# Patient Record
Sex: FEMALE | Race: WHITE | NOT HISPANIC OR LATINO | Employment: OTHER | URBAN - NONMETROPOLITAN AREA
[De-identification: names, ages, dates, MRNs, and addresses within clinical notes are randomized per-mention and may not be internally consistent; named-entity substitution may affect disease eponyms.]

---

## 2017-11-17 ENCOUNTER — TRANSFERRED RECORDS (OUTPATIENT)
Dept: HEALTH INFORMATION MANAGEMENT | Facility: OTHER | Age: 72
End: 2017-11-17

## 2018-11-26 ENCOUNTER — TRANSFERRED RECORDS (OUTPATIENT)
Dept: HEALTH INFORMATION MANAGEMENT | Facility: OTHER | Age: 73
End: 2018-11-26

## 2019-12-12 ENCOUNTER — TRANSFERRED RECORDS (OUTPATIENT)
Dept: HEALTH INFORMATION MANAGEMENT | Facility: OTHER | Age: 74
End: 2019-12-12

## 2020-12-28 ENCOUNTER — HOSPITAL ENCOUNTER (OUTPATIENT)
Dept: MAMMOGRAPHY | Facility: OTHER | Age: 75
Discharge: HOME | End: 2020-12-28
Attending: INTERNAL MEDICINE | Admitting: INTERNAL MEDICINE
Payer: MEDICARE

## 2020-12-28 DIAGNOSIS — Z12.31 VISIT FOR SCREENING MAMMOGRAM: ICD-10-CM

## 2020-12-28 PROCEDURE — 77063 BREAST TOMOSYNTHESIS BI: CPT

## 2021-01-14 ENCOUNTER — OFFICE VISIT (OUTPATIENT)
Dept: PEDIATRICS | Facility: OTHER | Age: 76
End: 2021-01-14
Attending: INTERNAL MEDICINE
Payer: MEDICARE

## 2021-01-14 VITALS
HEART RATE: 58 BPM | RESPIRATION RATE: 16 BRPM | OXYGEN SATURATION: 97 % | HEIGHT: 65 IN | WEIGHT: 185.8 LBS | DIASTOLIC BLOOD PRESSURE: 76 MMHG | SYSTOLIC BLOOD PRESSURE: 130 MMHG | TEMPERATURE: 98.1 F | BODY MASS INDEX: 30.96 KG/M2

## 2021-01-14 DIAGNOSIS — I10 ESSENTIAL HYPERTENSION: ICD-10-CM

## 2021-01-14 DIAGNOSIS — Z78.0 ASYMPTOMATIC MENOPAUSAL STATE: ICD-10-CM

## 2021-01-14 DIAGNOSIS — E78.5 DYSLIPIDEMIA: ICD-10-CM

## 2021-01-14 DIAGNOSIS — E53.8 VITAMIN B12 DEFICIENCY (NON ANEMIC): ICD-10-CM

## 2021-01-14 DIAGNOSIS — R53.1 WEAKNESS: ICD-10-CM

## 2021-01-14 DIAGNOSIS — R42 DIZZINESS: Primary | ICD-10-CM

## 2021-01-14 DIAGNOSIS — N18.31 STAGE 3A CHRONIC KIDNEY DISEASE (H): ICD-10-CM

## 2021-01-14 DIAGNOSIS — Z12.11 SCREENING FOR COLON CANCER: ICD-10-CM

## 2021-01-14 DIAGNOSIS — Z13.820 SCREENING FOR OSTEOPOROSIS: ICD-10-CM

## 2021-01-14 DIAGNOSIS — R79.89 OTHER SPECIFIED ABNORMAL FINDINGS OF BLOOD CHEMISTRY: ICD-10-CM

## 2021-01-14 DIAGNOSIS — I49.3 PVC'S (PREMATURE VENTRICULAR CONTRACTIONS): ICD-10-CM

## 2021-01-14 DIAGNOSIS — Z11.59 NEED FOR HEPATITIS C SCREENING TEST: ICD-10-CM

## 2021-01-14 DIAGNOSIS — I49.1 PAC (PREMATURE ATRIAL CONTRACTION): ICD-10-CM

## 2021-01-14 LAB
ANION GAP SERPL CALCULATED.3IONS-SCNC: 7 MMOL/L (ref 3–14)
BUN SERPL-MCNC: 17 MG/DL (ref 7–25)
CALCIUM SERPL-MCNC: 9.4 MG/DL (ref 8.6–10.3)
CHLORIDE SERPL-SCNC: 104 MMOL/L (ref 98–107)
CHOLEST SERPL-MCNC: 161 MG/DL
CO2 SERPL-SCNC: 28 MMOL/L (ref 21–31)
CREAT SERPL-MCNC: 1.01 MG/DL (ref 0.6–1.2)
DEPRECATED CALCIDIOL+CALCIFEROL SERPL-MC: 38.5 NG/ML
GFR SERPL CREATININE-BSD FRML MDRD: 53 ML/MIN/{1.73_M2}
GLUCOSE SERPL-MCNC: 97 MG/DL (ref 70–105)
HCV AB SERPL QL IA: NONREACTIVE
HDLC SERPL-MCNC: 51 MG/DL (ref 23–92)
IRON SATN MFR SERPL: 32 % (ref 20–55)
IRON SERPL-MCNC: 93 UG/DL (ref 50–212)
LDLC SERPL CALC-MCNC: 93 MG/DL
NONHDLC SERPL-MCNC: 110 MG/DL
POTASSIUM SERPL-SCNC: 4.4 MMOL/L (ref 3.5–5.1)
SODIUM SERPL-SCNC: 139 MMOL/L (ref 134–144)
TIBC SERPL-MCNC: 294 UG/DL (ref 245–400)
TRIGL SERPL-MCNC: 85 MG/DL
TSH SERPL DL<=0.05 MIU/L-ACNC: 3.81 IU/ML (ref 0.34–5.6)
UIBC (UNSATURATED): 201 MG/DL
VIT B12 SERPL-MCNC: 184 PG/ML (ref 180–914)

## 2021-01-14 PROCEDURE — 80061 LIPID PANEL: CPT | Mod: ZL | Performed by: INTERNAL MEDICINE

## 2021-01-14 PROCEDURE — G0463 HOSPITAL OUTPT CLINIC VISIT: HCPCS

## 2021-01-14 PROCEDURE — 82306 VITAMIN D 25 HYDROXY: CPT | Mod: ZL | Performed by: INTERNAL MEDICINE

## 2021-01-14 PROCEDURE — 86803 HEPATITIS C AB TEST: CPT | Mod: ZL | Performed by: INTERNAL MEDICINE

## 2021-01-14 PROCEDURE — 83550 IRON BINDING TEST: CPT | Mod: ZL | Performed by: INTERNAL MEDICINE

## 2021-01-14 PROCEDURE — 36415 COLL VENOUS BLD VENIPUNCTURE: CPT | Mod: ZL | Performed by: INTERNAL MEDICINE

## 2021-01-14 PROCEDURE — 84443 ASSAY THYROID STIM HORMONE: CPT | Mod: ZL | Performed by: INTERNAL MEDICINE

## 2021-01-14 PROCEDURE — 80048 BASIC METABOLIC PNL TOTAL CA: CPT | Mod: ZL | Performed by: INTERNAL MEDICINE

## 2021-01-14 PROCEDURE — 99214 OFFICE O/P EST MOD 30 MIN: CPT | Performed by: INTERNAL MEDICINE

## 2021-01-14 PROCEDURE — 82607 VITAMIN B-12: CPT | Mod: ZL | Performed by: INTERNAL MEDICINE

## 2021-01-14 PROCEDURE — 83540 ASSAY OF IRON: CPT | Mod: ZL | Performed by: INTERNAL MEDICINE

## 2021-01-14 RX ORDER — DILTIAZEM HYDROCHLORIDE 120 MG/1
120 CAPSULE, EXTENDED RELEASE ORAL
COMMUNITY
Start: 2019-10-22 | End: 2021-01-14

## 2021-01-14 RX ORDER — ATORVASTATIN CALCIUM 80 MG/1
80 TABLET, FILM COATED ORAL DAILY
Qty: 90 TABLET | Refills: 3 | Status: SHIPPED | OUTPATIENT
Start: 2021-01-14 | End: 2021-11-15

## 2021-01-14 RX ORDER — CETIRIZINE HYDROCHLORIDE 10 MG/1
10 TABLET ORAL
COMMUNITY
Start: 2019-10-22 | End: 2022-10-24

## 2021-01-14 RX ORDER — ATORVASTATIN CALCIUM 80 MG/1
80 TABLET, FILM COATED ORAL
COMMUNITY
Start: 2019-10-22 | End: 2021-01-14

## 2021-01-14 RX ORDER — DILTIAZEM HYDROCHLORIDE 120 MG/1
120 CAPSULE, EXTENDED RELEASE ORAL DAILY
Qty: 90 CAPSULE | Refills: 3 | Status: SHIPPED | OUTPATIENT
Start: 2021-01-14 | End: 2021-11-15

## 2021-01-14 SDOH — HEALTH STABILITY: MENTAL HEALTH: HOW OFTEN DO YOU HAVE 6 OR MORE DRINKS ON ONE OCCASION?: NOT ASKED

## 2021-01-14 SDOH — HEALTH STABILITY: MENTAL HEALTH: HOW MANY STANDARD DRINKS CONTAINING ALCOHOL DO YOU HAVE ON A TYPICAL DAY?: NOT ASKED

## 2021-01-14 SDOH — HEALTH STABILITY: MENTAL HEALTH: HOW OFTEN DO YOU HAVE A DRINK CONTAINING ALCOHOL?: NOT ASKED

## 2021-01-14 ASSESSMENT — MIFFLIN-ST. JEOR: SCORE: 1334.69

## 2021-01-14 ASSESSMENT — PAIN SCALES - GENERAL: PAINLEVEL: NO PAIN (0)

## 2021-01-14 NOTE — NURSING NOTE
"Chief Complaint   Patient presents with     Recheck Medication     Dizziness     Over 10+ years      Patient present for a recheck on medications. She has also dealt with dizziness for 10+ years. She has been seen for it but nobody has been able to figure out what the dizziness is from.    Initial /76 (BP Location: Right arm, Patient Position: Sitting, Cuff Size: Adult Large)   Pulse 58   Temp 98.1  F (36.7  C) (Tympanic)   Resp 16   Ht 1.645 m (5' 4.75\")   Wt 84.3 kg (185 lb 12.8 oz)   SpO2 97%   Breastfeeding No   BMI 31.16 kg/m   Estimated body mass index is 31.16 kg/m  as calculated from the following:    Height as of this encounter: 1.645 m (5' 4.75\").    Weight as of this encounter: 84.3 kg (185 lb 12.8 oz).  Medication Reconciliation: complete    Adore Mayorga MA  "

## 2021-01-14 NOTE — PATIENT INSTRUCTIONS
-- Tdap at your pharmacy   -- Get shingles vaccine records to us     -- Consider PT/OT   -- Consider [facility]

## 2021-01-14 NOTE — LETTER
January 15, 2021      Mackenzie Dos Santos  [id number] [address] [place] [id number]        Dear ,    We are writing to inform you of your test results.    Kidney function is just slightly reduced.  Thyroid normal.  Vitamin D normal.  Iron normal.  Vitamin B12 lower limit of normal.  I'll send a supplement. Maybe this will help with your dizziness. We can recheck the level in 6-12 months.    Great news, you do not have hepatitis C.    Signed, Asher Miller MD, FAAP, FACP  Internal Medicine & Pediatrics      Resulted Orders   Iron Binding Panel GH   Result Value Ref Range    Iron 93 50 - 212 ug/dL    UIBC (Unsaturated) 201.00 mg/dL    Iron Binding Capacity 294.00 245.00 - 400.00 ug/dL    Iron Saturation 32 20 - 55 %   Thyrotropin GH   Result Value Ref Range    Thyrotropin 3.81 0.34 - 5.60 IU/mL   Vitamin D Total GH   Result Value Ref Range    Vitamin D Total 38.5 ng/mL      Comment:      Comments:  Deficiency:             <10 ng/mL  Insufficiency:        10-29 ng/mL  Sufficiency:          ng/mL  Possible Toxicity:     >100 ng/mL     Vitamin B12   Result Value Ref Range    Vitamin B12 184 180 - 914 pg/mL   Hepatitis C Screen Reflex to HCV RNA Quant and Genotype   Result Value Ref Range    Hepatitis C Antibody Nonreactive NR^Nonreactive      Comment:      Assay performance characteristics have not been established for newborns,   infants, and children     Basic Metabolic Panel   Result Value Ref Range    Sodium 139 134 - 144 mmol/L    Potassium 4.4 3.5 - 5.1 mmol/L    Chloride 104 98 - 107 mmol/L    Carbon Dioxide 28 21 - 31 mmol/L    Anion Gap 7 3 - 14 mmol/L    Glucose 97 70 - 105 mg/dL    Urea Nitrogen 17 7 - 25 mg/dL    Creatinine 1.01 0.60 - 1.20 mg/dL    GFR Estimate 53 (L) >60 mL/min/[1.73_m2]    GFR Estimate If Black 65 >60 mL/min/[1.73_m2]    Calcium 9.4 8.6 - 10.3 mg/dL   Lipid Profile   Result Value Ref Range    Cholesterol 161 <200 mg/dL    Triglycerides 85 <150 mg/dL    HDL Cholesterol 51 23 - 92  mg/dL    LDL Cholesterol Calculated 93 <100 mg/dL      Comment:      Desirable:       <100 mg/dl    Non HDL Cholesterol 110 <130 mg/dL       If you have any questions or concerns, please call the clinic at the number listed above.       Sincerely,      Asher Miller MD

## 2021-01-14 NOTE — PROGRESS NOTES
"Subjective   Mackenzie VALLECILLO Workleana is a 75 year old female who presents for med check.  She has been dizzy for 10 years.  She tells me she has seen every kind of doctor there is about this.  She saw the neurologist, cardiologist, otolaryngologist, etc.  No one has been able to discover why she is dizzy.  She has had innumerable scans.  She thinks maybe she will does have to live with it.  This is the thing that bothers her the most.  She is on diltiazem but does not really know why.  When looking back that she was on it for skipped beats she says that she does not feel her skipped beats anymore.    Objective   Vitals: /76 (BP Location: Right arm, Patient Position: Sitting, Cuff Size: Adult Large)   Pulse 58   Temp 98.1  F (36.7  C) (Tympanic)   Resp 16   Ht 1.645 m (5' 4.75\")   Wt 84.3 kg (185 lb 12.8 oz)   SpO2 97%   Breastfeeding No   BMI 31.16 kg/m      General: well appearing  Neck: No JVD, no bruits  CV: Regular rate and rhythm, no murmur, rub or gallop  Pulm: Clear to auscultation bilaterally, no wheezing, rales or rhonchi  Psychiatry: Normal affect and insight.    Review and Analysis of Data   I personally reviewed the following:  External notes: I reviewed her most recent otolaryngology notes as well as cardiology notes from the last several years.  I was unable to find a neurology consultation.  Results: Yes  Use of an independent historian: No  Independent review of a test performed by another physician: No  Discussion of management with another physician: No  Moderate risk of morbidity from additional diagnostic testing and/or treatment.    Assessment & Plan   1. Dizziness  Looking back over her documentation in the computer regarding weak and dizzy she has had several thorough evaluations that I can see.  I am not able to see the neurology consultation or testing.  Unclear where that was obtained.  At her otolaryngology consult she had blood pressure readings consistent with orthostasis.  We " repeated orthostatic blood pressures today which were normal.  She could be having some adverse effect from the diltiazem although it does sound like it is very beneficial in suppressing her ectopy.  We discussed the possibility of additional consultations today and the patient declined.  - Vitamin B12; Future  - Vitamin D Total GH; Future  - Thyrotropin GH; Future  - Iron Binding Panel GH; Future  - Iron Binding Panel GH  - Thyrotropin GH  - Vitamin D Total GH  - Vitamin B12    2. Weakness   See above  - Thyrotropin GH; Future  - Thyrotropin GH    3. Dyslipidemia  At goal, no change.  - Lipid Profile; Future  - Basic Metabolic Panel; Future  - atorvastatin (LIPITOR) 80 MG tablet; Take 1 tablet (80 mg) by mouth daily Take 80 mg by mouth daily  Dispense: 90 tablet; Refill: 3  - Basic Metabolic Panel  - Lipid Profile    4. Essential hypertension  At goal, no change.  - Basic Metabolic Panel; Future  - Basic Metabolic Panel    5. Need for hepatitis C screening test    - Hepatitis C Screen Reflex to HCV RNA Quant and Genotype; Future  - Hepatitis C Screen Reflex to HCV RNA Quant and Genotype    6. Screening for colon cancer    - ABSTRACT COLOGUARD-NO CHARGE; Future    7. Screening for osteoporosis    - DX Hip/Pelvis/Spine; Future    8. PVC's (premature ventricular contractions)  At goal, no change.  - diltiazem ER (DILT-XR) 120 MG 24 hr capsule; Take 1 capsule (120 mg) by mouth daily Take 120 mg by mouth once daily  Dispense: 90 capsule; Refill: 3    9. PAC (premature atrial contraction)  At goal, no change.  - diltiazem ER (DILT-XR) 120 MG 24 hr capsule; Take 1 capsule (120 mg) by mouth daily Take 120 mg by mouth once daily  Dispense: 90 capsule; Refill: 3    10. Asymptomatic menopausal state     - DX Hip/Pelvis/Spine; Future    11. Body mass index (BMI) 31.0-31.9, adult     - Vitamin D Total GH; Future  - Vitamin D Total GH    12. Other specified abnormal findings of blood chemistry     - Iron Binding Panel GH;  Future  - Iron Binding Panel GH          Patient Instructions    -- Tdap at your pharmacy   -- Get shingles vaccine records to us     -- Consider PT/OT   -- Consider [facility]      Return in about 1 year (around 1/14/2022), or if symptoms worsen or fail to improve, for med management.    Signed, Asher Miller MD, FAAP, FACP  Internal Medicine & Pediatrics

## 2021-01-15 PROBLEM — E53.8 VITAMIN B12 DEFICIENCY (NON ANEMIC): Status: ACTIVE | Noted: 2021-01-15

## 2021-01-15 PROBLEM — N18.31 STAGE 3A CHRONIC KIDNEY DISEASE (H): Status: ACTIVE | Noted: 2021-01-15

## 2021-01-15 RX ORDER — LANOLIN ALCOHOL/MO/W.PET/CERES
1000 CREAM (GRAM) TOPICAL DAILY
Qty: 90 TABLET | Refills: 3 | Status: SHIPPED | OUTPATIENT
Start: 2021-01-15

## 2021-02-22 ENCOUNTER — TELEPHONE (OUTPATIENT)
Dept: PEDIATRICS | Facility: OTHER | Age: 76
End: 2021-02-22

## 2021-02-22 NOTE — TELEPHONE ENCOUNTER
Pt would like to get the results from her cologaurd test      Dashawn Gillespie on 2/22/2021 at 2:53 PM

## 2021-02-22 NOTE — TELEPHONE ENCOUNTER
After birth date and last name were verified, patient informed that we do not have the results yet.  I will leave this message open so the nurse for Asher Miller MD can watch for it.  Marva Medina CMA ([facility])

## 2021-02-22 NOTE — TELEPHONE ENCOUNTER
After birth date and last name were verified, asked patient when she sent her sample in to be tested.  She couldn't remember, but said she got a letter today thanking her for being screened and that results were sent to her provider.  I do not see them in her chart.  Have you seen the results?  Marva Medina CMA ([facility])

## 2021-02-25 NOTE — TELEPHONE ENCOUNTER
Left message to call back     Writer contacted [facility] where Cologuard test is done and was informed result was negative. They will be re-faxing result.     Adore Mayorga CMA on 2/25/2021 at 1:40 PM

## 2021-03-04 ENCOUNTER — HOSPITAL ENCOUNTER (OUTPATIENT)
Dept: BONE DENSITY | Facility: OTHER | Age: 76
Discharge: HOME | End: 2021-03-04
Attending: INTERNAL MEDICINE | Admitting: INTERNAL MEDICINE
Payer: MEDICARE

## 2021-03-04 DIAGNOSIS — Z78.0 ASYMPTOMATIC MENOPAUSAL STATE: ICD-10-CM

## 2021-03-04 DIAGNOSIS — Z13.820 SCREENING FOR OSTEOPOROSIS: ICD-10-CM

## 2021-03-04 PROCEDURE — 77080 DXA BONE DENSITY AXIAL: CPT

## 2021-03-07 ENCOUNTER — HEALTH MAINTENANCE LETTER (OUTPATIENT)
Age: 76
End: 2021-03-07

## 2021-05-19 ENCOUNTER — OFFICE VISIT (OUTPATIENT)
Dept: PEDIATRICS | Facility: OTHER | Age: 76
End: 2021-05-19
Attending: INTERNAL MEDICINE
Payer: COMMERCIAL

## 2021-05-19 VITALS
RESPIRATION RATE: 16 BRPM | TEMPERATURE: 97.9 F | OXYGEN SATURATION: 97 % | DIASTOLIC BLOOD PRESSURE: 80 MMHG | WEIGHT: 187 LBS | BODY MASS INDEX: 31.36 KG/M2 | HEART RATE: 50 BPM | SYSTOLIC BLOOD PRESSURE: 120 MMHG

## 2021-05-19 DIAGNOSIS — R42 DIZZINESS: ICD-10-CM

## 2021-05-19 DIAGNOSIS — J30.89 PERENNIAL ALLERGIC RHINITIS: Primary | ICD-10-CM

## 2021-05-19 DIAGNOSIS — J45.20 MILD INTERMITTENT EXTRINSIC ASTHMA WITHOUT COMPLICATION: ICD-10-CM

## 2021-05-19 DIAGNOSIS — Z87.891 PERSONAL HISTORY OF TOBACCO USE, PRESENTING HAZARDS TO HEALTH: ICD-10-CM

## 2021-05-19 PROCEDURE — G0463 HOSPITAL OUTPT CLINIC VISIT: HCPCS

## 2021-05-19 PROCEDURE — 99214 OFFICE O/P EST MOD 30 MIN: CPT | Performed by: INTERNAL MEDICINE

## 2021-05-19 RX ORDER — ALBUTEROL SULFATE 90 UG/1
2 AEROSOL, METERED RESPIRATORY (INHALATION) EVERY 4 HOURS PRN
Qty: 18 G | Refills: 3 | Status: SHIPPED | OUTPATIENT
Start: 2021-05-19 | End: 2023-12-30

## 2021-05-19 RX ORDER — FLUTICASONE PROPIONATE 50 MCG
2 SPRAY, SUSPENSION (ML) NASAL DAILY
Qty: 48 G | Refills: 11 | Status: SHIPPED | OUTPATIENT
Start: 2021-05-19 | End: 2022-06-02

## 2021-05-19 RX ORDER — INHALER, ASSIST DEVICES
SPACER (EA) MISCELLANEOUS
Qty: 1 EACH | Refills: 11 | Status: SHIPPED | OUTPATIENT
Start: 2021-05-19

## 2021-05-19 ASSESSMENT — PAIN SCALES - GENERAL: PAINLEVEL: NO PAIN (0)

## 2021-05-19 NOTE — NURSING NOTE
Chief Complaint   Patient presents with     Breathing Problem     Shortness of Breath/ wheezing      States this has been going on X 10 days. She also states she has some fatigue with the SOB also.     Medication Reconciliation: completed   Kiley Amador LPN  5/19/2021 8:27 AM

## 2021-05-19 NOTE — PATIENT INSTRUCTIONS
-- Try nasal saline irrigation (with distilled water)    Nasal saline spray    NeilMed sinus rinse    Neti pot   -- Start nasal steroid spray daily (eg Nasacort, Flonase)   -- When using steroid spray: tilt head forward, spray away from center septum, don't sniff deeply during inhalation.   -- Steroid spray works best when used consistently, not as needed.   -- Continue Zyrtec (without -D), can help for sneezing, itchy eyes, etc.   -- Okay to use diphenhydramine (Benadryl) as needed for sneezing, nasal congestion, can cause dry mouth, urinary retention, blurry vision, constipation   -- Okay to briefly use oxymetazoline (Afrin) 2 sprays both nostrils, nightly for 3-4 days, then stop as can cause rebound congestion   -- Shower/bathe before bed to wash pollen away   -- Elevate head of bed to facilitate sinus drainage   -- Consider getting a HEPA filter   -- Use a cool mist humidifier during the dry season, clean weekly with vinegar       -- Albuterol as needed for cough, wheezing   -- If short of breathing with exertion persists, would schedule more testing

## 2021-05-19 NOTE — PROGRESS NOTES
Subjective   Mackenzie VALLECILLO Workman is a 76 year old female who presents for shortness of breath possible allergies.  For the last 10 days she has been experiencing wheezing, exhaustion.  She continues on her allergy treatments of Zyrtec and a Tylenol allergy pill.  She takes Flonase over-the-counter about once a week.  She typically has allergies and every season throughout the year.  No fever.  No body aches.  No known sick contacts.  She is always dizzy.  She has had an extensive work-up in the past.  She wonders if she may have a vitamin deficiency.    Objective   Vitals: /80 (BP Location: Right arm, Patient Position: Sitting, Cuff Size: Adult Large)   Pulse 50   Temp 97.9  F (36.6  C) (Tympanic)   Resp 16   Wt 84.8 kg (187 lb)   SpO2 97%   Breastfeeding No   BMI 31.36 kg/m      Cardiovascular: Regular rate and rhythm, no murmur  Pulmonary lungs are clear to auscultation  HEENT: Tympanic membranes are normal.  Mild posterior erythema is present.    Review and Analysis of Data   I personally reviewed the following:  External notes: No  Results: Yes I reviewed her labs we obtained recently to look at vitamins and electrolytes working up dizziness.  Use of an independent historian: No  Independent review of a test performed by another physician: No  Discussion of management with another physician: No  Moderate risk of morbidity from additional diagnostic testing and/or treatment.    Assessment & Plan   1. Perennial allergic rhinitis  I think perennial allergic rhinosinusitis is the trigger that is exacerbating her symptoms.  She could have some underlying asthma versus COPD.  We discussed options and decided on a trial of albuterol.  Use of holding chamber was discussed.  We discussed the possibility of additional testing including but not limited to stress testing, PFTs but the patient declined at this time.    - fluticasone (FLONASE) 50 MCG/ACT nasal spray; Spray 2 sprays into both nostrils daily  Dispense:  48 g; Refill: 11    2. Mild intermittent extrinsic asthma without complication  See above  - albuterol (PROAIR HFA/PROVENTIL HFA/VENTOLIN HFA) 108 (90 Base) MCG/ACT inhaler; Inhale 2 puffs into the lungs every 4 hours as needed for shortness of breath / dyspnea or wheezing  Dispense: 18 g; Refill: 3  - Spacer/Aero-Holding Chambers (VORTEX VALVED HOLDING CHAMBER) CHICA; Use with HFA  Dispense: 1 each; Refill: 11    3. Personal history of tobacco use, presenting hazards to health  See above    4. Dizziness  I think the most likely exacerbating feature for her dizziness is inner ear dysfunction.  Hopefully she will see improvement with consistent use of Flonase.  Could consider ENT consultation versus neurology consultation for tilt table testing.  Offered physical therapy as well which was declined.  Low normal B12 not likely contributing.        Patient Instructions    -- Try nasal saline irrigation (with distilled water)    Nasal saline spray    NeilMed sinus rinse    Neti pot   -- Start nasal steroid spray daily (eg Nasacort, Flonase)   -- When using steroid spray: tilt head forward, spray away from center septum, don't sniff deeply during inhalation.   -- Steroid spray works best when used consistently, not as needed.   -- Continue Zyrtec (without -D), can help for sneezing, itchy eyes, etc.   -- Okay to use diphenhydramine (Benadryl) as needed for sneezing, nasal congestion, can cause dry mouth, urinary retention, blurry vision, constipation   -- Okay to briefly use oxymetazoline (Afrin) 2 sprays both nostrils, nightly for 3-4 days, then stop as can cause rebound congestion   -- Shower/bathe before bed to wash pollen away   -- Elevate head of bed to facilitate sinus drainage   -- Consider getting a HEPA filter   -- Use a cool mist humidifier during the dry season, clean weekly with vinegar       -- Albuterol as needed for cough, wheezing   -- If short of breathing with exertion persists, would schedule more  testing      Return if symptoms worsen or fail to improve.    SignedAsher MD, FAAP, FACP  Internal Medicine & Pediatrics

## 2021-10-11 ENCOUNTER — HEALTH MAINTENANCE LETTER (OUTPATIENT)
Age: 76
End: 2021-10-11

## 2021-10-21 ENCOUNTER — ALLIED HEALTH/NURSE VISIT (OUTPATIENT)
Dept: FAMILY MEDICINE | Facility: OTHER | Age: 76
End: 2021-10-21
Attending: INTERNAL MEDICINE
Payer: MEDICARE

## 2021-10-21 DIAGNOSIS — Z23 NEED FOR PROPHYLACTIC VACCINATION AND INOCULATION AGAINST INFLUENZA: Primary | ICD-10-CM

## 2021-10-21 PROCEDURE — 90662 IIV NO PRSV INCREASED AG IM: CPT

## 2021-10-21 PROCEDURE — G0008 ADMIN INFLUENZA VIRUS VAC: HCPCS

## 2021-11-15 ENCOUNTER — TELEPHONE (OUTPATIENT)
Dept: PEDIATRICS | Facility: OTHER | Age: 76
End: 2021-11-15
Payer: COMMERCIAL

## 2021-11-15 DIAGNOSIS — I49.3 PVC'S (PREMATURE VENTRICULAR CONTRACTIONS): ICD-10-CM

## 2021-11-15 DIAGNOSIS — I49.1 PAC (PREMATURE ATRIAL CONTRACTION): ICD-10-CM

## 2021-11-15 DIAGNOSIS — E78.5 DYSLIPIDEMIA: ICD-10-CM

## 2021-11-15 RX ORDER — ATORVASTATIN CALCIUM 80 MG/1
80 TABLET, FILM COATED ORAL DAILY
Qty: 90 TABLET | Refills: 3 | Status: SHIPPED | OUTPATIENT
Start: 2021-11-15 | End: 2022-10-24

## 2021-11-15 RX ORDER — DILTIAZEM HYDROCHLORIDE 120 MG/1
120 CAPSULE, EXTENDED RELEASE ORAL DAILY
Qty: 90 CAPSULE | Refills: 3 | Status: SHIPPED | OUTPATIENT
Start: 2021-11-15 | End: 2022-12-21

## 2021-11-15 NOTE — TELEPHONE ENCOUNTER
No, follow-up in the spring.      ICD-10-CM    1. PVC's (premature ventricular contractions)  I49.3 diltiazem ER (DILT-XR) 120 MG 24 hr capsule   2. PAC (premature atrial contraction)  I49.1 diltiazem ER (DILT-XR) 120 MG 24 hr capsule   3. Dyslipidemia  E78.5 atorvastatin (LIPITOR) 80 MG tablet      Orders Placed This Encounter   Medications     diltiazem ER (DILT-XR) 120 MG 24 hr capsule     Sig: Take 1 capsule (120 mg) by mouth daily Take 120 mg by mouth once daily     Dispense:  90 capsule     Refill:  3     atorvastatin (LIPITOR) 80 MG tablet     Sig: Take 1 tablet (80 mg) by mouth daily Take 80 mg by mouth daily     Dispense:  90 tablet     Refill:  3     Signed, Asher Miller MD, FAAP, FACP  Internal Medicine & Pediatrics

## 2021-11-15 NOTE — TELEPHONE ENCOUNTER
After birth date and last name were verified, states she is going to [place] 12/28/21 until 4/1/22.  She has refills on her other medications, but needs the 2 below or she will run out in January.  Does she need labs or anything before she goes?  Marva Medina CMA ([facility])

## 2021-11-15 NOTE — TELEPHONE ENCOUNTER
Patient is leaving for [place] can she get RX's to use.  Has questions about other medications.  Please call      Trice Moralez on 11/15/2021 at 9:56 AM

## 2022-03-14 ENCOUNTER — TRANSFERRED RECORDS (OUTPATIENT)
Dept: HEALTH INFORMATION MANAGEMENT | Facility: OTHER | Age: 77
End: 2022-03-14

## 2022-03-27 ENCOUNTER — HEALTH MAINTENANCE LETTER (OUTPATIENT)
Age: 77
End: 2022-03-27

## 2022-04-11 ENCOUNTER — TELEPHONE (OUTPATIENT)
Dept: PEDIATRICS | Facility: OTHER | Age: 77
End: 2022-04-11
Payer: COMMERCIAL

## 2022-04-11 NOTE — TELEPHONE ENCOUNTER
I spoke to pt and she wants to see cardiology because of her slow heart beat.  She is due to see them in May.  Alix Zhao CMA ([facility])......................4/11/2022  4:44 PM

## 2022-04-11 NOTE — TELEPHONE ENCOUNTER
Patient called wanting to see cardiology.  Would Dr. Miller be willing to place a referral?  Thank you!  Silvina Camacho on 4/11/2022 at 4:16 PM

## 2022-04-12 ENCOUNTER — OFFICE VISIT (OUTPATIENT)
Dept: PEDIATRICS | Facility: OTHER | Age: 77
End: 2022-04-12
Attending: INTERNAL MEDICINE
Payer: COMMERCIAL

## 2022-04-12 VITALS
HEART RATE: 70 BPM | DIASTOLIC BLOOD PRESSURE: 70 MMHG | TEMPERATURE: 98 F | OXYGEN SATURATION: 97 % | BODY MASS INDEX: 32.5 KG/M2 | WEIGHT: 193.8 LBS | SYSTOLIC BLOOD PRESSURE: 126 MMHG | RESPIRATION RATE: 14 BRPM

## 2022-04-12 DIAGNOSIS — R00.2 HEART PALPITATIONS: ICD-10-CM

## 2022-04-12 DIAGNOSIS — R42 DIZZINESS: Primary | ICD-10-CM

## 2022-04-12 DIAGNOSIS — R06.09 DOE (DYSPNEA ON EXERTION): ICD-10-CM

## 2022-04-12 DIAGNOSIS — J44.9 COPD, MODERATE (H): ICD-10-CM

## 2022-04-12 LAB
ATRIAL RATE - MUSE: 51 BPM
DIASTOLIC BLOOD PRESSURE - MUSE: NORMAL MMHG
INTERPRETATION ECG - MUSE: NORMAL
P AXIS - MUSE: 69 DEGREES
PR INTERVAL - MUSE: 134 MS
QRS DURATION - MUSE: 94 MS
QT - MUSE: 460 MS
QTC - MUSE: 423 MS
R AXIS - MUSE: -54 DEGREES
SYSTOLIC BLOOD PRESSURE - MUSE: NORMAL MMHG
T AXIS - MUSE: 14 DEGREES
VENTRICULAR RATE- MUSE: 51 BPM

## 2022-04-12 PROCEDURE — 93005 ELECTROCARDIOGRAM TRACING: CPT | Performed by: INTERNAL MEDICINE

## 2022-04-12 PROCEDURE — 99214 OFFICE O/P EST MOD 30 MIN: CPT | Performed by: INTERNAL MEDICINE

## 2022-04-12 PROCEDURE — 93010 ELECTROCARDIOGRAM REPORT: CPT | Performed by: INTERNAL MEDICINE

## 2022-04-12 PROCEDURE — G0463 HOSPITAL OUTPT CLINIC VISIT: HCPCS

## 2022-04-12 ASSESSMENT — PAIN SCALES - GENERAL: PAINLEVEL: NO PAIN (0)

## 2022-04-12 NOTE — PROGRESS NOTES
"Assessment & Plan   1. Dizziness  2. Heart palpitations  EKG is reassuring today, sinus rhythm is present.  Differential diagnosis includes sick sinus syndrome, paroxysmal atrial fibrillation, paroxysmal supraventricular tachycardia, other bradycardia or tachyarrhythmias, others.  I hear no signs of valvular disease or carotid or vertebral stenoses.  We would consider discontinuation of diltiazem should her event monitoring be within normal limits.  - EKG 12-lead, tracing only  - Leadless EKG Monitor 8 to 14 Days; Future  - Echo Stress Echocardiogram; Future    3. COPD, moderate (H)  Likely a significant cause of her dyspnea on exertion is related to known moderate COPD.  We discussed the possibility of obtaining a new pulmonary function test but decided against it at this time.    4. HERMOSILLO (dyspnea on exertion)  I am concerned about the possibility of coronary artery disease and stable angina as the cause of her symptoms and recommend stress testing.  Advised her against significant exertion until cardiac etiologies can be ruled out.  - Leadless EKG Monitor 8 to 14 Days; Future  - Echo Stress Echocardiogram; Future      Patient Instructions    -- Zio patch event monitor   -- Schedule stress test: bike echo   -- Consider Cardiology consult   -- Follow-up after testing to discuss results      Return in about 1 month (around 5/12/2022) for follow-up after results.    SignedAsher MD, FAAP, FACP  Internal Medicine & Pediatrics    Subjective   Mackenzie Dos Santos is a 77 year old female who presents for cardiology referral.  She has been battling dizzy spells for many years, more than 5.  She has been seeing a cardiologist in the [facility].  She had extensive testing including audiology with no etiology discovered.  While she was recently in [place] she is felt dizzy.  She saw a doctor who told her she \"probably has A. fib.\"  This was after auscultation.  No EKG was obtained.  She thinks she has a slow pulse.  The lowest " "she ever heard was it was down to 42 at the clinic but \"that was years ago.\"  She is experiencing a significant increase in shortness of breath on exertion \"but I am old now.\"    Objective   Vitals: /70   Pulse 70   Temp 98  F (36.7  C) (Tympanic)   Resp 14   Wt 87.9 kg (193 lb 12.8 oz)   SpO2 97%   Breastfeeding No   BMI 32.50 kg/m      Cardiovascular: Regular rate and rhythm, no murmur.  No anterior posterior neck bruits  Pulmonary: Clear    Review and Analysis of Data   I personally reviewed the following:  External notes: Yes I reviewed the most recent cardiology notes through [facility]  Results: Yes I reviewed previous testing via [facility] including stress testing from 2019, pulmonary function testing 2019  Use of an independent historian: Yes I spoke with her   Independent review of a test performed by another physician: No  Discussion of management with another physician: No  Moderate risk of morbidity from additional diagnostic testing and/or treatment.  ipped beat is heard.  Pulmonary: Clear      "

## 2022-04-12 NOTE — NURSING NOTE
Chief Complaint   Patient presents with     Heart Problem         Medication Reconciliation: pia Gomez

## 2022-04-12 NOTE — PATIENT INSTRUCTIONS
-- Halo patch event monitor   -- Schedule stress test: bike echo   -- Consider Cardiology consult   -- Follow-up after testing to discuss results

## 2022-04-12 NOTE — TELEPHONE ENCOUNTER
States she has had irregular heart rate, seeing a specialist, if she exhausts herself it goes down. She sees Dr Lovely Hu from [facility] Cardio Phone number is [phone]. She would like to establish with Cardiology here. She is scheduled for 1:20 today with  to start the referral process. Carley Gomez on 4/12/2022 at 8:40 AM

## 2022-04-12 NOTE — TELEPHONE ENCOUNTER
I don't believe we've addressed this issue.  How low is her HR.  If under 40, she should be seen today.  Else I'd recommend scheduling and OV with me first before seeing cardiology.    Signed, Asher Miller MD, FAAP, FACP  Internal Medicine & Pediatrics

## 2022-05-12 ENCOUNTER — HOSPITAL ENCOUNTER (OUTPATIENT)
Dept: CARDIOLOGY | Facility: OTHER | Age: 77
Discharge: HOME | End: 2022-05-12
Attending: INTERNAL MEDICINE | Admitting: INTERNAL MEDICINE
Payer: MEDICARE

## 2022-05-12 PROCEDURE — 999N000157 HC STATISTIC RCP TIME EA 10 MIN

## 2022-05-12 PROCEDURE — 93246 EXT ECG>7D<15D RECORDING: CPT

## 2022-05-12 PROCEDURE — 93248 EXT ECG>7D<15D REV&INTERPJ: CPT | Performed by: INTERNAL MEDICINE

## 2022-06-01 DIAGNOSIS — J30.89 PERENNIAL ALLERGIC RHINITIS: ICD-10-CM

## 2022-06-02 RX ORDER — FLUTICASONE PROPIONATE 50 MCG
2 SPRAY, SUSPENSION (ML) NASAL DAILY
Qty: 16 G | Refills: 3 | Status: SHIPPED | OUTPATIENT
Start: 2022-06-02 | End: 2022-08-18

## 2022-06-02 NOTE — TELEPHONE ENCOUNTER
Routing refill request to provider for review/approval because:    LOV: 4/12/22  Dr. Miller out office will route to covering provider for review and consideration    Celi Fowler RN on 6/2/2022 at 10:10 AM

## 2022-06-07 ENCOUNTER — TELEPHONE (OUTPATIENT)
Dept: CARDIOLOGY | Facility: OTHER | Age: 77
End: 2022-06-07
Payer: COMMERCIAL

## 2022-06-07 NOTE — TELEPHONE ENCOUNTER
Patient verified .  Reminder call for stress test with instructions given.  Pt reports she has not been taking her Diltiazem and will remain off of it.  Due to dizziness per her statement.  She will bring her inhaler with her to the appt.  Patient verbalized understanding.

## 2022-06-10 ENCOUNTER — HOSPITAL ENCOUNTER (OUTPATIENT)
Dept: CARDIOLOGY | Facility: OTHER | Age: 77
Discharge: HOME | End: 2022-06-10
Attending: INTERNAL MEDICINE | Admitting: INTERNAL MEDICINE
Payer: MEDICARE

## 2022-06-10 ENCOUNTER — TELEPHONE (OUTPATIENT)
Dept: PEDIATRICS | Facility: OTHER | Age: 77
End: 2022-06-10

## 2022-06-10 DIAGNOSIS — R42 DIZZINESS: ICD-10-CM

## 2022-06-10 DIAGNOSIS — R06.09 DOE (DYSPNEA ON EXERTION): ICD-10-CM

## 2022-06-10 DIAGNOSIS — R00.2 HEART PALPITATIONS: ICD-10-CM

## 2022-06-10 PROCEDURE — 93018 CV STRESS TEST I&R ONLY: CPT | Performed by: STUDENT IN AN ORGANIZED HEALTH CARE EDUCATION/TRAINING PROGRAM

## 2022-06-10 PROCEDURE — 93350 STRESS TTE ONLY: CPT | Mod: 26 | Performed by: STUDENT IN AN ORGANIZED HEALTH CARE EDUCATION/TRAINING PROGRAM

## 2022-06-10 PROCEDURE — 93016 CV STRESS TEST SUPVJ ONLY: CPT | Performed by: STUDENT IN AN ORGANIZED HEALTH CARE EDUCATION/TRAINING PROGRAM

## 2022-06-10 PROCEDURE — C8928 TTE W OR W/O FOL W/CON,STRES: HCPCS

## 2022-06-10 PROCEDURE — 93017 CV STRESS TEST TRACING ONLY: CPT

## 2022-06-10 PROCEDURE — 93325 DOPPLER ECHO COLOR FLOW MAPG: CPT | Mod: 26 | Performed by: STUDENT IN AN ORGANIZED HEALTH CARE EDUCATION/TRAINING PROGRAM

## 2022-06-10 PROCEDURE — 255N000002 HC RX 255 OP 636: Performed by: INTERNAL MEDICINE

## 2022-06-10 PROCEDURE — 999N000208 ECHO STRESS ECHOCARDIOGRAM

## 2022-06-10 PROCEDURE — 93321 DOPPLER ECHO F-UP/LMTD STD: CPT | Mod: 26 | Performed by: STUDENT IN AN ORGANIZED HEALTH CARE EDUCATION/TRAINING PROGRAM

## 2022-06-10 RX ADMIN — PERFLUTREN 5 ML: 6.52 INJECTION, SUSPENSION INTRAVENOUS at 10:03

## 2022-06-10 NOTE — TELEPHONE ENCOUNTER
Please call Ms. Dos Santos and let her know that the stress test was negative/normal.  Recommend follow-up in office to discuss results, symptoms.    Asher Webb MD, FAAP, FACP  Internal Medicine & Pediatrics

## 2022-06-10 NOTE — PROGRESS NOTES
0900:  The patient arrived for a stress echo.  The procedure, risks and benefits were discussed and the consent was signed.  The patient was prepped for the stress test, and an IV was placed per procedure.  The echo sonographer did the initial images with definity for image enhancement.    arrived, and the patient biked 4 minutes and 15 seconds.  The patient reported SOB and chest tightness while biking which resolved within resting of several minutes.  Stress images were completed and the IV was removed per procedure.  The patient was released in stable condition.  The patient was instructed that the ordering MD will call with results in one to two days.  If you have not received your results within 3 days please call the ordering provider.  Please see the chart for complete test results.

## 2022-07-05 ENCOUNTER — OFFICE VISIT (OUTPATIENT)
Dept: PEDIATRICS | Facility: OTHER | Age: 77
End: 2022-07-05
Attending: INTERNAL MEDICINE
Payer: COMMERCIAL

## 2022-07-05 VITALS
TEMPERATURE: 97.5 F | RESPIRATION RATE: 16 BRPM | WEIGHT: 189.7 LBS | SYSTOLIC BLOOD PRESSURE: 136 MMHG | BODY MASS INDEX: 31.81 KG/M2 | DIASTOLIC BLOOD PRESSURE: 86 MMHG | OXYGEN SATURATION: 96 % | HEART RATE: 71 BPM

## 2022-07-05 DIAGNOSIS — I47.10 PAROXYSMAL SUPRAVENTRICULAR TACHYCARDIA (H): ICD-10-CM

## 2022-07-05 DIAGNOSIS — Z87.891 PERSONAL HISTORY OF TOBACCO USE, PRESENTING HAZARDS TO HEALTH: ICD-10-CM

## 2022-07-05 DIAGNOSIS — J44.9 OBSTRUCTIVE LUNG DISEASE (H): ICD-10-CM

## 2022-07-05 DIAGNOSIS — J45.20 MILD INTERMITTENT EXTRINSIC ASTHMA WITHOUT COMPLICATION: ICD-10-CM

## 2022-07-05 DIAGNOSIS — I47.29 PAROXYSMAL VENTRICULAR TACHYCARDIA (H): Primary | ICD-10-CM

## 2022-07-05 PROCEDURE — G0463 HOSPITAL OUTPT CLINIC VISIT: HCPCS

## 2022-07-05 PROCEDURE — 99214 OFFICE O/P EST MOD 30 MIN: CPT | Performed by: INTERNAL MEDICINE

## 2022-07-05 RX ORDER — MULTIVIT-MIN/IRON/FOLIC ACID/K 18-600-40
CAPSULE ORAL
COMMUNITY

## 2022-07-05 ASSESSMENT — PAIN SCALES - GENERAL: PAINLEVEL: NO PAIN (0)

## 2022-07-05 NOTE — NURSING NOTE
"Chief Complaint   Patient presents with     Follow Up     Stress test/echo          Initial /86 (BP Location: Right arm, Patient Position: Sitting, Cuff Size: Adult Large)   Pulse 71   Temp 97.5  F (36.4  C) (Tympanic)   Resp 16   Wt 86 kg (189 lb 11.2 oz)   SpO2 96%   BMI 31.81 kg/m   Estimated body mass index is 31.81 kg/m  as calculated from the following:    Height as of 1/14/21: 1.645 m (5' 4.75\").    Weight as of this encounter: 86 kg (189 lb 11.2 oz).       Medication Reconciliation: Complete    Lindsay Ross LPN .......  7/5/2022  3:07 PM   "

## 2022-07-05 NOTE — PROGRESS NOTES
Assessment & Plan   1. Paroxysmal ventricular tachycardia (H)  2. Paroxysmal supraventricular tachycardia (H)  It is reassuring that her stress test was normal however her previous reported coronary calcium scoring was elevated and paroxysmal ventricular tachycardia is present on event monitoring.  I recommend cardiology consultation as a next step to discuss these features and see if additional restratification is warranted.  She was curious about whether or not a pacemaker is indicated and I do not see an indication for one today however it is possible she could be someone who would benefit from an ICD.  Again recommend cardiology consult as a next step.    3. Obstructive lung disease (H)  4. Mild intermittent extrinsic asthma without complication  5. Personal history of tobacco use, presenting hazards to health  We reviewed her most recent pulmonary function testing report from a few years ago which revealed obstructive airway pattern with improvement after bronchodilator.  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  We discussed options and decided to obtain PFTs and follow-up to discuss.  - Pulmonary Function Test (); Future      Patient Instructions    -- Consult with Dr. Hunter as planned.  Discuss intermittent ventricular tachycardia   -- Schedule breathing test (PFT)   -- Follow-up with Dr. Miller after PFTs      Return if symptoms worsen or fail to improve.    Signed, Asher Miller MD, FAAP, FACP  Internal Medicine & Pediatrics    Subjective   Mackenzie Dos Santos is a 77 year old female who presents for discuss test results    Objective   Vitals: /86 (BP Location: Right arm, Patient Position: Sitting, Cuff Size: Adult Large)   Pulse 71   Temp 97.5  F (36.4  C) (Tympanic)   Resp 16   Wt 86 kg (189 lb 11.2 oz)   SpO2 96%   BMI 31.81 kg/m        Review and Analysis of Data   I personally reviewed the following:  External notes: No  Results: Yes We reviewed her  recent stress test reports, cardiac event monitor  Use of an independent historian: Yes I spoke with her   Independent review of a test performed by another physician: No  Discussion of management with another physician: No  Moderate risk of morbidity from additional diagnostic testing and/or treatment.

## 2022-07-05 NOTE — PATIENT INSTRUCTIONS
-- Consult with Dr. Hunter as planned.  Discuss intermittent ventricular tachycardia   -- Schedule breathing test (PFT)   -- Follow-up with Dr. Miller after PFTs

## 2022-07-26 ENCOUNTER — OFFICE VISIT (OUTPATIENT)
Dept: CARDIOLOGY | Facility: OTHER | Age: 77
End: 2022-07-26
Attending: INTERNAL MEDICINE
Payer: COMMERCIAL

## 2022-07-26 VITALS
DIASTOLIC BLOOD PRESSURE: 80 MMHG | HEIGHT: 65 IN | BODY MASS INDEX: 31.49 KG/M2 | HEART RATE: 51 BPM | SYSTOLIC BLOOD PRESSURE: 138 MMHG | TEMPERATURE: 97.3 F | RESPIRATION RATE: 18 BRPM | WEIGHT: 189 LBS | OXYGEN SATURATION: 98 %

## 2022-07-26 DIAGNOSIS — E55.9 VITAMIN D DEFICIENCY: ICD-10-CM

## 2022-07-26 DIAGNOSIS — J44.9 CHRONIC OBSTRUCTIVE PULMONARY DISEASE, UNSPECIFIED COPD TYPE (H): ICD-10-CM

## 2022-07-26 DIAGNOSIS — R42 DIZZINESS: ICD-10-CM

## 2022-07-26 DIAGNOSIS — R06.09 DOE (DYSPNEA ON EXERTION): Primary | ICD-10-CM

## 2022-07-26 DIAGNOSIS — J45.909 UNCOMPLICATED ASTHMA, UNSPECIFIED ASTHMA SEVERITY, UNSPECIFIED WHETHER PERSISTENT: ICD-10-CM

## 2022-07-26 DIAGNOSIS — I49.3 FREQUENT PVCS: ICD-10-CM

## 2022-07-26 DIAGNOSIS — E53.8 VITAMIN B12 DEFICIENCY (NON ANEMIC): ICD-10-CM

## 2022-07-26 DIAGNOSIS — I47.10 SVT (SUPRAVENTRICULAR TACHYCARDIA) (H): ICD-10-CM

## 2022-07-26 DIAGNOSIS — I49.1 PAC (PREMATURE ATRIAL CONTRACTION): ICD-10-CM

## 2022-07-26 DIAGNOSIS — R00.2 HEART PALPITATIONS: ICD-10-CM

## 2022-07-26 DIAGNOSIS — N18.31 STAGE 3A CHRONIC KIDNEY DISEASE (H): ICD-10-CM

## 2022-07-26 DIAGNOSIS — Z87.891 HISTORY OF TOBACCO ABUSE: ICD-10-CM

## 2022-07-26 LAB
ATRIAL RATE - MUSE: 52 BPM
DIASTOLIC BLOOD PRESSURE - MUSE: NORMAL MMHG
INTERPRETATION ECG - MUSE: NORMAL
P AXIS - MUSE: -4 DEGREES
PR INTERVAL - MUSE: 168 MS
QRS DURATION - MUSE: 98 MS
QT - MUSE: 468 MS
QTC - MUSE: 435 MS
R AXIS - MUSE: 108 DEGREES
SYSTOLIC BLOOD PRESSURE - MUSE: NORMAL MMHG
T AXIS - MUSE: 20 DEGREES
VENTRICULAR RATE- MUSE: 52 BPM

## 2022-07-26 PROCEDURE — 93005 ELECTROCARDIOGRAM TRACING: CPT | Performed by: INTERNAL MEDICINE

## 2022-07-26 PROCEDURE — G0463 HOSPITAL OUTPT CLINIC VISIT: HCPCS | Mod: 25

## 2022-07-26 PROCEDURE — 93010 ELECTROCARDIOGRAM REPORT: CPT | Performed by: INTERNAL MEDICINE

## 2022-07-26 PROCEDURE — 99205 OFFICE O/P NEW HI 60 MIN: CPT | Performed by: INTERNAL MEDICINE

## 2022-07-26 ASSESSMENT — PAIN SCALES - GENERAL: PAINLEVEL: NO PAIN (0)

## 2022-07-26 NOTE — NURSING NOTE
"Patent comes in for consult for dizziness and heart palpitations.  Evelyn Ferreira LPN ....................7/26/2022   1:42 PM  Chief Complaint   Patient presents with     Consult For     Dizziness,  heart palpitations       Initial /80 (BP Location: Right arm, Patient Position: Sitting, Cuff Size: Adult Large)   Pulse 51   Temp 97.3  F (36.3  C) (Tympanic)   Resp 18   Ht 1.638 m (5' 4.5\")   Wt 85.7 kg (189 lb)   SpO2 98%   BMI 31.94 kg/m   Estimated body mass index is 31.94 kg/m  as calculated from the following:    Height as of this encounter: 1.638 m (5' 4.5\").    Weight as of this encounter: 85.7 kg (189 lb).  Meds Reconciled: complete  Pt is on Aspirin  Pt is on a Statin  PHQ and/or HAL reviewed. Pt referred to PCP/MH Provider as appropriate.    Evelyn Ferreira LPN    FOOD SECURITY SCREENING QUESTIONS  Hunger Vital Signs:  Within the past 12 months we worried whether our food would run out before we got money to buy more. Never  Within the past 12 months the food we bought just didn't last and we didn't have money to get more. Never  Evelyn Ferreira LPN 7/26/2022 1:42 PM    "

## 2022-07-26 NOTE — PROGRESS NOTES
[facility] HEART CARE   CARDIOLOGY CONSULT     Mackenzie Dos Santos   1945  [id number]    Asher Miller     Chief Complaint   Patient presents with     Consult For     Dizziness,  heart palpitations          HPI:   Mrs. Dos Santos is a 77-year-old female who is being seen by cardiology for palpitations.  She is here in follow-up to her Zio patch, stress test, and history of CT for calcium of the coronary arteries with elevated calcium score of 482.9 on 11/21/2016 through [facility].    She has been dyspneic on exertion with a history of asthma, smoking up to 2 packs a day, quitting 30 years ago.  Concern for COPD with PFT's pending.  CKD-3, B12 deficiency at 189 on 1/14/2021 and vitamin D deficiency.    She reports she has had palpitations for approximately 10 years. She feels an occasional skipping in her chest.  She has 1-2 episodes per day. She recounts she was in [place] and was told when evaluated that she could have A. fib.  There was no formal work-up made.  She has been confused as she was told she may have had a heart attack in the past and also may need a pacemaker/ICD.  She essentially decided that she needs clarification and would like to talk to a cardiologist.  This is the reason she was here today.    We reviewed her Zio patch from 5/12/2022.  She was concerned she had A. fib.  She did not have any atrial fibrillation.  She had x3 episodes of VT lasting up to 7 beats, x283 episodes of SVT lasting up to 15.7 seconds, PVC's at 13.9%, and frequent PAC's of 7.0%.  Results discussed.    She also had a stress echo on 6/10/2022.  It was considered a normal low risk stress echo.  EF was 60-65% increasing to 70% or greater.  There were no regional wall motion abnormalities at rest or with exercise.  There was no angina.    We reviewed her CT scan for calcium through [facility] on 11/21/2016.  Total calcium score was 482.  Left main was 2.5, .7 which may involve part of the distal left main, circumflex 0, and  RCA 16.7.    Patient clearly denies any chest pain, chest tightness, or anginal symptoms.  We reviewed testing as outlined above.  She has not had significant swelling.  She is dyspneic on exertion with PFT's planned in the future.  She states she smoked up to 2 packs a day, quitting in her 30s or 40s.  She also has a history of asthma.  She was told that she has anginal symptoms in the future and if severe, she would need to be seen in the ER.  Otherwise, she can contact the clinic and stress testing/imaging could be performed.        IMAGING RESULTS:   Stress echo on 6/10/2022:  Normal, low-risk exercise echocardiogram.   The target heart rate was achieved, reached 88% of MPHR. Normal heart rate and   borderline BP response to exercise reaching max 222/94mmHg.   Normal biventricular size, thickness, and global systolic function at   baseline, LVEF=60-65%.   With exercise, LVEF increased to >70% and LV cavity size decreased   appropriately.   No regional wall motion abnormalities are present at rest or with exercise.   No angina was elicited.   No ECG evidence of ischemia with some PAC's and PVC's during stress.   No significant valvular abnormalities are noted on screening Doppler exam.   The aortic root and visualized ascending aorta are normal.    Zio patch in 4/13/2022:  Analysis Time: 13 days, 16 hours starting 5/12/2022  Minimum heart rate 39, maximum 210, average 57 bpm.  Predominant underlying rhythm was sinus rhythm.  3 SVT runs with aberrancy vs ventricular tachycardia runs occurred, the run with the fastest interval lasted 7 beats with maximum rate of 179 bpm.  Longest lasted 6 beats with average rate of 169 bpm.  283 supraventricular tachycardia runs occurred.  The run with the fastest interval lasted 19 beats with maximum rate of 210 bpm.  Longest lasted 15.7 seconds with average rate of 136 bpm.  Idioventricular rhythm was present.  Isolated supraventricular ectopic beats were frequent.  SVE couplets  were occasional.  SVE triplets were rare.  Isolated ventricular ectopic beats was frequent.  No ventricular couplets or triplets present.  Ventricular bigeminy and trigeminy were present.    ECHO on 8/15/19:  The ejection fraction is visually estimated at 65%.    No significant valvular heart disease noted.    Periods of irregular bradycardic heart beat, difficult to assess on this study consider AV/SA   block if clinically indicated.    Estimated EF: 65% .    Stress test on 8/16/19:  1. Pharmacologic stress myocardial perfusion imaging is NORMAL.   2. The pharmacological stress ECG was negative for ischemia and does not   meet criteria for ischemia   3. The perfusion images are normal.  No significant evidence of ischemia   4. Overall left ventricular systolic function was normal without wall   motion abnormalities.   5. There are no prior studies available for comparison.     6. The semi-quantitative calcium score is severely abnormal found in the   Left main, LAD, LCX and RCA territory.  Total 735.7.  Left main 14.3.  LAD   507.7.  Circumflex 0.  .7.  This places the patient in the 75th   percentile for her age and gender.  (Reference norms: Minimal Calcium:   1-10; Mild Calcium: ; Moderate Calcium: 101-400; High Calcium: 401   and above).   7. Please see radiology report for non-cardiac findings.             Calcium score on 11/21/16:  v  Total: 482.9. L Main: 2.5; LAD: 463.7 (part of this is actually in the   distal left main and ostial left circumflex); LCX: zero; RCA: 16.7   (accuracy of this number is slightly reduced due to cardiac motion   artifact).         CURRENT MEDICATIONS:   Prior to Admission medications    Medication Sig Start Date End Date Taking? Authorizing Provider   albuterol (PROAIR HFA/PROVENTIL HFA/VENTOLIN HFA) 108 (90 Base) MCG/ACT inhaler Inhale 2 puffs into the lungs every 4 hours as needed for shortness of breath / dyspnea or wheezing 5/19/21   Asher Miller MD    aspirin (ASA) 81 MG EC tablet Take one tablet by mouth every day.    Reported, Patient   atorvastatin (LIPITOR) 80 MG tablet Take 1 tablet (80 mg) by mouth daily Take 80 mg by mouth daily 11/15/21   Asher Miller MD   cetirizine (ZYRTEC) 10 MG tablet Take 10 mg by mouth Take 10 mg by mouth daily  Patient not taking: Reported on 7/5/2022 10/22/19   Reported, Patient   cyanocobalamin (VITAMIN B-12) 1000 MCG tablet Take 1 tablet (1,000 mcg) by mouth daily 1/15/21   Asher Miller MD   diltiazem ER (DILT-XR) 120 MG 24 hr capsule Take 1 capsule (120 mg) by mouth daily Take 120 mg by mouth once daily 11/15/21   Asher Miller MD   fluticasone (FLONASE) 50 MCG/ACT nasal spray SPRAY 2 SPRAYS INTO BOTH NOSTRILS DAILY 6/2/22   Jass Willams MD   Spacer/Aero-Holding Chambers (VORTEX VALVED HOLDING CHAMBER) CHICA Use with HFA 5/19/21   Asher Miller MD   Vitamin D, Cholecalciferol, 25 MCG (1000 UT) TABS     Reported, Patient       ALLERGIES:   No Known Allergies     PAST MEDICAL HISTORY:   No past medical history on file.     PAST SURGICAL HISTORY:   No past surgical history on file.     FAMILY HISTORY:   Family History   Problem Relation Age of Onset     Brain Cancer Son         glioblastoma IV     Colon Cancer No family hx of         SOCIAL HISTORY:   Social History     Socioeconomic History     Marital status:    Tobacco Use     Smoking status: Former Smoker     Packs/day: 1.50     Years: 30.00     Pack years: 45.00     Smokeless tobacco: Never Used   Vaping Use     Vaping Use: Never used   Substance and Sexual Activity     Alcohol use: Not Currently     Drug use: Never     Sexual activity: Yes     Partners: Male          ROS:   CONSTITUTIONAL: No weight loss, fever, chills, weakness or fatigue.   HEENT: Eyes: No visual changes. Ears, Nose, Throat: No hearing loss, congestion or difficulty swallowing.   CARDIOVASCULAR: No chest pain, chest pressure or chest discomfort. No  palpitations or lower extremity edema.   RESPIRATORY: Admits to shortness of breath with dyspnea upon exertion, but no cough or sputum production.   GASTROINTESTINAL: No abdominal pain. No anorexia, nausea, vomiting or diarrhea.   NEUROLOGICAL: No headache, lightheadedness, dizziness, syncope, ataxia or weakness.   HEMATOLOGIC: No anemia, bleeding or bruising.   PSYCHIATRIC: No history of depression or anxiety.   ENDOCRINOLOGIC: No reports of sweating, cold or heat intolerance. No polyuria or polydipsia.   SKIN: No abnormal rashes or itching.       PHYSICAL EXAM:   GENERAL: The patient is a well-developed, well-nourished, in no apparent distress. Alert and oriented x3.   HEENT: Head is normocephalic and atraumatic. Eyes are symmetrical with normal visual tracking.  HEART: Regular rate and rhythm, S1S2 present without murmur, rub or gallop.   LUNGS: Respirations regular and unlabored. Clear to auscultation.   EXTREMITIES: No peripheral edema present.   NEUROLOGIC: Alert and oriented X3.    SKIN: No jaundice. No rashes or visible skin lesions present.        LAB RESULTS:   No visits with results within 2 Month(s) from this visit.   Latest known visit with results is:   Office Visit on 04/12/2022   Component Date Value Ref Range Status     Ventricular Rate 04/12/2022 51  BPM Final     Atrial Rate 04/12/2022 51  BPM Final     SD Interval 04/12/2022 134  ms Final     QRS Duration 04/12/2022 94  ms Final     QT 04/12/2022 460  ms Final     QTc 04/12/2022 423  ms Final     P Axis 04/12/2022 69  degrees Final     R AXIS 04/12/2022 -54  degrees Final     T Axis 04/12/2022 14  degrees Final     Interpretation ECG 04/12/2022    Final                    Value:Sinus bradycardia with Premature atrial complexes  Left axis deviation  Cannot rule out Inferior infarct  Abnormal ECG  No previous ECGs available  Confirmed by MD JOANN, MACK ([id number]) on 4/12/2022 2:23:40 PM            ASSESSMENT:       ICD-10-CM    1. HERMOSILLO (dyspnea on  exertion)  R06.00    2. Chronic obstructive pulmonary disease, unspecified COPD type (H)  J44.9    3. Asthma  J45.909    4. Heart palpitations  R00.2 Adult Cardiology Eval  Referral     EKG 12-lead, tracing only   5. SVT (supraventricular tachycardia) (H)  I47.1    6. Frequent PVCs  I49.3    7. PAC (premature atrial contraction)  I49.1    8. Dizziness  R42 Adult Cardiology Eval  Referral     EKG 12-lead, tracing only   9. Stage 3a chronic kidney disease (H)  N18.31    10. Vitamin B12 deficiency (non anemic)  E53.8    11. History of tobacco abuse  Z87.891    12. Vitamin D deficiency  E55.9          PLAN:   1.  Palpitations: Likely VT, SVT, and frequent PACs/PVCs: Reviewed her patch from 4/13/2022 showing PACs at 13.9%, PVCs at 7.0%, x283 runs of SVT lasting up to 15.7 seconds, and x3 episodes of VT lasting up to 7 beats.  No A. fib, heart block, or pauses were noted.  Results explained.  Patient describes symptoms as bothersome but not wanting to add make changes to medications.  Patient was reassured.  Does not need pacemaker/ICD.  2.  HERMOSILLO: I am not seeing a cardiac reason for her shortness of breath.  She has history of asthma/COPD with history of tobacco abuse.  PFTs planned.  Has not had use inhalers/breathing treatments in the past.  3.  CKD 3: Stable.  4.  History of tobacco abuse quitting in her 30s/40s. 2 packs a day.  5.  Follow-up in the future on as-needed basis.    Total time spent on day of visit, including review of tests, obtaining/reviewing separately obtained history, ordering medications/tests/procedures, communicating with PCP/consultants, and documenting in electronic medical record: 60 minutes.         Thank you for allowing me to participate in the care of your patient. Please do not hesitate to contact me if you have any questions.     Grupo Hunter, DO

## 2022-08-18 DIAGNOSIS — J30.89 PERENNIAL ALLERGIC RHINITIS: ICD-10-CM

## 2022-08-18 RX ORDER — FLUTICASONE PROPIONATE 50 MCG
2 SPRAY, SUSPENSION (ML) NASAL DAILY
Qty: 16 G | Refills: 3 | Status: SHIPPED | OUTPATIENT
Start: 2022-08-18 | End: 2022-12-14

## 2022-08-18 NOTE — TELEPHONE ENCOUNTER
"  Last Prescription Date: 6/2/22  Last Qty/Refills: 16g / 3  Last Office Visit: 7/5/22  Future Office Visit: None     Requested Prescriptions   Pending Prescriptions Disp Refills     fluticasone (FLONASE) 50 MCG/ACT nasal spray [Pharmacy Med Name: FLUTICASONE 50MCG/ACT SPRAY] 16 g 3     Sig: SPRAY 2 SPRAYS INTO BOTH NOSTRILS DAILY       Nasal Allergy Protocol Passed - 8/18/2022  1:10 AM        Passed - Patient is age 12 or older        Passed - Recent (12 mo) or future (30 days) visit within the authorizing provider's specialty     Patient has had an office visit with the authorizing provider or a provider within the authorizing providers department within the previous 12 mos or has a future within next 30 days. See \"Patient Info\" tab in inbasket, or \"Choose Columns\" in Meds & Orders section of the refill encounter.              Passed - Medication is active on med list             "

## 2022-08-24 DIAGNOSIS — I49.1 PAC (PREMATURE ATRIAL CONTRACTION): ICD-10-CM

## 2022-08-24 DIAGNOSIS — E78.5 DYSLIPIDEMIA: ICD-10-CM

## 2022-08-24 DIAGNOSIS — I49.3 PVC'S (PREMATURE VENTRICULAR CONTRACTIONS): ICD-10-CM

## 2022-08-25 RX ORDER — DILTIAZEM HYDROCHLORIDE 120 MG/1
CAPSULE, EXTENDED RELEASE ORAL
Qty: 90 CAPSULE | Refills: 2 | OUTPATIENT
Start: 2022-08-25

## 2022-08-25 RX ORDER — ATORVASTATIN CALCIUM 80 MG/1
TABLET, FILM COATED ORAL
Qty: 90 TABLET | Refills: 2 | OUTPATIENT
Start: 2022-08-25

## 2022-08-26 ENCOUNTER — ALLIED HEALTH/NURSE VISIT (OUTPATIENT)
Dept: FAMILY MEDICINE | Facility: OTHER | Age: 77
End: 2022-08-26
Attending: FAMILY MEDICINE
Payer: MEDICARE

## 2022-08-26 DIAGNOSIS — Z20.822 COVID-19 RULED OUT: Primary | ICD-10-CM

## 2022-08-26 PROCEDURE — U0005 INFEC AGEN DETEC AMPLI PROBE: HCPCS | Mod: ZL

## 2022-08-26 PROCEDURE — C9803 HOPD COVID-19 SPEC COLLECT: HCPCS

## 2022-08-26 NOTE — NURSING NOTE
Patient swabbed for COVID-19 testing for PFT.  Maryuri Silverman, ANGELA on 8/26/2022 at 9:35 AM

## 2022-08-27 LAB — SARS-COV-2 RNA RESP QL NAA+PROBE: NEGATIVE

## 2022-08-29 ENCOUNTER — HOSPITAL ENCOUNTER (OUTPATIENT)
Dept: RESPIRATORY THERAPY | Facility: OTHER | Age: 77
Discharge: HOME | End: 2022-08-29
Attending: INTERNAL MEDICINE | Admitting: INTERNAL MEDICINE
Payer: MEDICARE

## 2022-08-29 DIAGNOSIS — Z87.891 PERSONAL HISTORY OF TOBACCO USE, PRESENTING HAZARDS TO HEALTH: ICD-10-CM

## 2022-08-29 DIAGNOSIS — J45.20 MILD INTERMITTENT EXTRINSIC ASTHMA WITHOUT COMPLICATION: ICD-10-CM

## 2022-08-29 DIAGNOSIS — J44.9 OBSTRUCTIVE LUNG DISEASE (H): ICD-10-CM

## 2022-08-29 PROCEDURE — 94729 DIFFUSING CAPACITY: CPT | Mod: 26 | Performed by: INTERNAL MEDICINE

## 2022-08-29 PROCEDURE — 94726 PLETHYSMOGRAPHY LUNG VOLUMES: CPT | Mod: 26 | Performed by: INTERNAL MEDICINE

## 2022-08-29 PROCEDURE — 999N000157 HC STATISTIC RCP TIME EA 10 MIN

## 2022-08-29 PROCEDURE — 94060 EVALUATION OF WHEEZING: CPT

## 2022-08-29 PROCEDURE — 250N000009 HC RX 250: Performed by: INTERNAL MEDICINE

## 2022-08-29 PROCEDURE — 94726 PLETHYSMOGRAPHY LUNG VOLUMES: CPT

## 2022-08-29 PROCEDURE — 94640 AIRWAY INHALATION TREATMENT: CPT

## 2022-08-29 PROCEDURE — 94060 EVALUATION OF WHEEZING: CPT | Mod: 26 | Performed by: INTERNAL MEDICINE

## 2022-08-29 PROCEDURE — 94729 DIFFUSING CAPACITY: CPT

## 2022-08-29 RX ORDER — ALBUTEROL SULFATE 0.83 MG/ML
2.5 SOLUTION RESPIRATORY (INHALATION) ONCE
Status: COMPLETED | OUTPATIENT
Start: 2022-08-29 | End: 2022-08-29

## 2022-08-29 RX ADMIN — ALBUTEROL SULFATE 2.5 MG: 2.5 SOLUTION RESPIRATORY (INHALATION) at 09:23

## 2022-09-08 ENCOUNTER — TELEPHONE (OUTPATIENT)
Dept: PEDIATRICS | Facility: OTHER | Age: 77
End: 2022-09-08

## 2022-09-08 NOTE — TELEPHONE ENCOUNTER
I spoke with pt and she is not understanding what the PFT results mean.  She is wondering if Trent Alcazar MD could explain it to her in layman's what it means.  She states he can send it to her via [employer] if he would like.  Alix Zhao CMA ([facility])......................9/8/2022  4:17 PM

## 2022-09-08 NOTE — TELEPHONE ENCOUNTER
Patient would like to speak with [facility] in regards to breathing test. She is wanting to go over results.     Vijaya Amezcua on 9/8/2022 at 4:00 PM

## 2022-09-09 NOTE — TELEPHONE ENCOUNTER
Note was sent to pt via [employer] to update her of pcp return on 9/15 and Dr. Alcazar response to her PFT  Andra Watkins RN on 9/9/2022 at 8:24 AM

## 2022-09-09 NOTE — TELEPHONE ENCOUNTER
She has emphysema.     Should talk with Asher Blankenship about treatment options.     Electronically signed by:  Trent Alcazar MD  on September 8, 2022 10:39 PM

## 2022-09-14 ENCOUNTER — TELEPHONE (OUTPATIENT)
Dept: PEDIATRICS | Facility: OTHER | Age: 77
End: 2022-09-14

## 2022-09-14 NOTE — TELEPHONE ENCOUNTER
Patient is aware you are gone and would like you to call when you are back on her breathing test results

## 2022-09-15 NOTE — TELEPHONE ENCOUNTER
I sent her a [employer] message this morning requesting she  schedule an office visit.    Signed, Asher Miller MD, FAAP, FACP  Internal Medicine & Pediatrics

## 2022-10-24 ENCOUNTER — OFFICE VISIT (OUTPATIENT)
Dept: PEDIATRICS | Facility: OTHER | Age: 77
End: 2022-10-24
Attending: INTERNAL MEDICINE
Payer: MEDICARE

## 2022-10-24 ENCOUNTER — MYC MEDICAL ADVICE (OUTPATIENT)
Dept: PEDIATRICS | Facility: OTHER | Age: 77
End: 2022-10-24

## 2022-10-24 VITALS
SYSTOLIC BLOOD PRESSURE: 130 MMHG | HEIGHT: 65 IN | BODY MASS INDEX: 31.32 KG/M2 | RESPIRATION RATE: 20 BRPM | DIASTOLIC BLOOD PRESSURE: 88 MMHG | HEART RATE: 65 BPM | TEMPERATURE: 96.9 F | OXYGEN SATURATION: 95 % | WEIGHT: 188 LBS

## 2022-10-24 DIAGNOSIS — N18.31 STAGE 3A CHRONIC KIDNEY DISEASE (H): ICD-10-CM

## 2022-10-24 DIAGNOSIS — J44.9 CHRONIC OBSTRUCTIVE PULMONARY DISEASE, UNSPECIFIED COPD TYPE (H): Primary | ICD-10-CM

## 2022-10-24 DIAGNOSIS — Z13.220 SCREENING FOR HYPERLIPIDEMIA: ICD-10-CM

## 2022-10-24 DIAGNOSIS — Z23 NEED FOR VACCINATION: ICD-10-CM

## 2022-10-24 DIAGNOSIS — J30.1 SEASONAL ALLERGIC RHINITIS DUE TO POLLEN: ICD-10-CM

## 2022-10-24 DIAGNOSIS — E78.2 MIXED HYPERLIPIDEMIA: ICD-10-CM

## 2022-10-24 LAB
ANION GAP SERPL CALCULATED.3IONS-SCNC: 7 MMOL/L (ref 3–14)
BUN SERPL-MCNC: 20 MG/DL (ref 7–25)
CALCIUM SERPL-MCNC: 9.5 MG/DL (ref 8.6–10.3)
CHLORIDE BLD-SCNC: 104 MMOL/L (ref 98–107)
CHOLEST SERPL-MCNC: 140 MG/DL
CO2 SERPL-SCNC: 28 MMOL/L (ref 21–31)
CREAT SERPL-MCNC: 0.95 MG/DL (ref 0.6–1.2)
CREAT UR-MCNC: 70.7 MG/DL
FASTING STATUS PATIENT QL REPORTED: YES
GFR SERPL CREATININE-BSD FRML MDRD: 61 ML/MIN/1.73M2
GLUCOSE BLD-MCNC: 88 MG/DL (ref 70–105)
HDLC SERPL-MCNC: 44 MG/DL (ref 23–92)
HGB BLD-MCNC: 13.9 G/DL (ref 11.7–15.7)
LDLC SERPL CALC-MCNC: 79 MG/DL
MICROALBUMIN UR-MCNC: <12 MG/L
MICROALBUMIN/CREAT UR: NORMAL MG/G{CREAT}
NONHDLC SERPL-MCNC: 96 MG/DL
POTASSIUM BLD-SCNC: 4.2 MMOL/L (ref 3.5–5.1)
SODIUM SERPL-SCNC: 139 MMOL/L (ref 134–144)
TRIGL SERPL-MCNC: 84 MG/DL

## 2022-10-24 PROCEDURE — 80048 BASIC METABOLIC PNL TOTAL CA: CPT | Mod: ZL | Performed by: INTERNAL MEDICINE

## 2022-10-24 PROCEDURE — G0463 HOSPITAL OUTPT CLINIC VISIT: HCPCS

## 2022-10-24 PROCEDURE — G0463 HOSPITAL OUTPT CLINIC VISIT: HCPCS | Mod: 25

## 2022-10-24 PROCEDURE — 36415 COLL VENOUS BLD VENIPUNCTURE: CPT | Mod: ZL | Performed by: INTERNAL MEDICINE

## 2022-10-24 PROCEDURE — 90732 PPSV23 VACC 2 YRS+ SUBQ/IM: CPT

## 2022-10-24 PROCEDURE — 85018 HEMOGLOBIN: CPT | Mod: ZL | Performed by: INTERNAL MEDICINE

## 2022-10-24 PROCEDURE — 80061 LIPID PANEL: CPT | Mod: ZL | Performed by: INTERNAL MEDICINE

## 2022-10-24 PROCEDURE — G0008 ADMIN INFLUENZA VIRUS VAC: HCPCS

## 2022-10-24 PROCEDURE — G0009 ADMIN PNEUMOCOCCAL VACCINE: HCPCS

## 2022-10-24 PROCEDURE — 99214 OFFICE O/P EST MOD 30 MIN: CPT | Performed by: INTERNAL MEDICINE

## 2022-10-24 PROCEDURE — 82043 UR ALBUMIN QUANTITATIVE: CPT | Mod: ZL | Performed by: INTERNAL MEDICINE

## 2022-10-24 RX ORDER — ATORVASTATIN CALCIUM 40 MG/1
40 TABLET, FILM COATED ORAL DAILY
Qty: 90 TABLET | Refills: 4 | Status: SHIPPED | OUTPATIENT
Start: 2022-10-24 | End: 2023-05-22

## 2022-10-24 RX ORDER — MONTELUKAST SODIUM 10 MG/1
10 TABLET ORAL AT BEDTIME
Qty: 90 TABLET | Refills: 3 | Status: SHIPPED | OUTPATIENT
Start: 2022-10-24 | End: 2023-05-22

## 2022-10-24 ASSESSMENT — ASTHMA QUESTIONNAIRES
QUESTION_1 LAST FOUR WEEKS HOW MUCH OF THE TIME DID YOUR ASTHMA KEEP YOU FROM GETTING AS MUCH DONE AT WORK, SCHOOL OR AT HOME: NONE OF THE TIME
QUESTION_5 LAST FOUR WEEKS HOW WOULD YOU RATE YOUR ASTHMA CONTROL: WELL CONTROLLED
QUESTION_3 LAST FOUR WEEKS HOW OFTEN DID YOUR ASTHMA SYMPTOMS (WHEEZING, COUGHING, SHORTNESS OF BREATH, CHEST TIGHTNESS OR PAIN) WAKE YOU UP AT NIGHT OR EARLIER THAN USUAL IN THE MORNING: NOT AT ALL
QUESTION_4 LAST FOUR WEEKS HOW OFTEN HAVE YOU USED YOUR RESCUE INHALER OR NEBULIZER MEDICATION (SUCH AS ALBUTEROL): NOT AT ALL
QUESTION_2 LAST FOUR WEEKS HOW OFTEN HAVE YOU HAD SHORTNESS OF BREATH: ONCE A DAY
ACT_TOTALSCORE: 21
ACT_TOTALSCORE: 21

## 2022-10-24 ASSESSMENT — PAIN SCALES - GENERAL: PAINLEVEL: NO PAIN (0)

## 2022-10-24 NOTE — NURSING NOTE
"Chief Complaint   Patient presents with     RECHECK     labs         Initial /88   Pulse 65   Temp 96.9  F (36.1  C) (Tympanic)   Resp 20   Ht 1.638 m (5' 4.5\")   Wt 85.3 kg (188 lb)   LMP  (LMP Unknown)   SpO2 95%   Breastfeeding No   BMI 31.77 kg/m   Estimated body mass index is 31.77 kg/m  as calculated from the following:    Height as of this encounter: 1.638 m (5' 4.5\").    Weight as of this encounter: 85.3 kg (188 lb).         Norma J. Gosselin, LPN   "

## 2022-10-24 NOTE — TELEPHONE ENCOUNTER
This was based on a previous GFR from 1/14/2021. We can discuss at our next visit.    Signed, Asher Miller MD, FAAP, FACP  Internal Medicine & Pediatrics

## 2022-10-24 NOTE — PATIENT INSTRUCTIONS
-- Get a Moderna Bivalent vaccine   -- Tdap at pharmacy   -- Flu and pneumonia shots today     -- Sinus saline irrigation   -- Start Singulair   -- Continue Flonase   -- Consider ENT consult     -- Daily cardio exercise to keep lungs strong     -- Reduce Lipitor to 40 mg   -- Plan to recheck a lipid panel in 6 months

## 2022-10-24 NOTE — PROGRESS NOTES
Assessment & Plan   1. Chronic obstructive pulmonary disease, unspecified COPD type (H)  Made a lengthy discussion today with regards to COPD including pathophysiology, expected clinical course, possible complications.  No additional inhaled medications recommended at this time.  However if she were to develop increasing frequency of bronchitis would consider the addition of inhaled corticosteroid and bronchodilator.  Plan to monitor for now.    2. Stage 3a chronic kidney disease (H)  At goal, no change.  - Albumin Random Urine Quantitative with Creat Ratio; Future  - Hemoglobin; Future  - BASIC METABOLIC PANEL; Future  - Hemoglobin  - BASIC METABOLIC PANEL  - Albumin Random Urine Quantitative with Creat Ratio    3. Screening for hyperlipidemia  - Lipid Profile; Future  - Lipid Profile    4. Need for vaccination  - INFLUENZA, QUAD, HIGH DOSE, PF, 65YR + (FLUZONE HD)  - GH IMM-  PNEUMOCOCCAL VACCINE,ADULT,SQ OR IM  - Tdap, tetanus-diptheria-acell pertussis, (BOOSTRIX) 5-2.5-18.5 LF-MCG/0.5 SUSP injection; Inject 0.5 mLs into the muscle once for 1 dose  Dispense: 0.5 mL; Refill: 0    5. Seasonal allergic rhinitis due to pollen  Fall allergies have worsened.  I recommend adding Singulair.  - montelukast (SINGULAIR) 10 MG tablet; Take 1 tablet (10 mg) by mouth At Bedtime  Dispense: 90 tablet; Refill: 3    6. Mixed hyperlipidemia  It is unclear to me why she is on such a high dose of atorvastatin.  Its possible she had quite elevated cholesterol levels in the past.  I see no medical reason to use high dose.  We discussed options and decided to reduce and recheck a lipid panel in 6 months.  - atorvastatin (LIPITOR) 40 MG tablet; Take 1 tablet (40 mg) by mouth daily  Dispense: 90 tablet; Refill: 4      Patient Instructions    -- Get a Moderna Bivalent vaccine   -- Tdap at pharmacy   -- Flu and pneumonia shots today     -- Sinus saline irrigation   -- Start Singulair   -- Continue Flonase   -- Consider ENT consult     --  "Daily cardio exercise to keep lungs strong     -- Reduce Lipitor to 40 mg   -- Plan to recheck a lipid panel in 6 months      Return if symptoms worsen or fail to improve.    Signed, Asher Miller MD, FAAP, FACP  Internal Medicine & Pediatrics    Subjective   Mackenzie Dos Santos is a 77 year old female who presents for discuss breathing test.  She wants to know how she can have emphysema now that she quit smoking a long time ago.  She has not had any episodes of bronchitis in the last year.  She uses albuterol as needed.  She wants to try weaning down on her dose of atorvastatin.  She is never had a history of stroke or heart attack.  No stents or bypass surgery.  She is not sure why she has to take 80 mg.  She thinks it may be causing her dizzy spells.    Objective   Vitals: /88   Pulse 65   Temp 96.9  F (36.1  C) (Tympanic)   Resp 20   Ht 1.638 m (5' 4.5\")   Wt 85.3 kg (188 lb)   LMP  (LMP Unknown)   SpO2 95%   Breastfeeding No   BMI 31.77 kg/m      General: well appearing  CV: Regular rate and rhythm, no murmur, rub or gallop  Pulm: Clear to auscultation bilaterally, no wheezing, rales or rhonchi  Neuro: Grossly intact  Musculoskeletal: No lower extremity edema  Skin: No rash  Psychiatry: Normal affect and insight.    Review and Analysis of Data   I personally reviewed the following:  External notes: No  Results: Yes Pulmonary function testing report and study reviewed with patient today  Use of an independent historian: No  Independent review of a test performed by another physician: No  Discussion of management with another physician: No  Moderate risk of morbidity from additional diagnostic testing and/or treatment.    "

## 2022-10-25 ENCOUNTER — TELEPHONE (OUTPATIENT)
Dept: PEDIATRICS | Facility: OTHER | Age: 77
End: 2022-10-25

## 2022-10-25 NOTE — TELEPHONE ENCOUNTER
KPM - Patient states the visit summary shows stage 3a chronic kidney disease, and patient has never had a kidney infection.  She is very concerned and would like to know why it says that.    Tamiko Valle on 10/25/2022 at 10:33 AM'

## 2022-10-25 NOTE — TELEPHONE ENCOUNTER
This is not alarming.  We can discuss at our next visit.    Signed, Asher Miller MD, FAAP, FACP  Internal Medicine & Pediatrics

## 2022-10-27 NOTE — TELEPHONE ENCOUNTER
Spoke with patient and gave her the information from Dr Miller. And reiterated that Dr Miller will talk to her and explain the lab work at her next office visit.  Norma J. Gosselin, LPN .......  10/27/2022  11:03 AM

## 2022-12-13 DIAGNOSIS — J30.89 PERENNIAL ALLERGIC RHINITIS: ICD-10-CM

## 2022-12-14 RX ORDER — FLUTICASONE PROPIONATE 50 MCG
2 SPRAY, SUSPENSION (ML) NASAL DAILY
Qty: 16 G | Refills: 3 | Status: SHIPPED | OUTPATIENT
Start: 2022-12-14 | End: 2023-06-15

## 2022-12-20 DIAGNOSIS — I49.1 PAC (PREMATURE ATRIAL CONTRACTION): ICD-10-CM

## 2022-12-20 DIAGNOSIS — I49.3 PVC'S (PREMATURE VENTRICULAR CONTRACTIONS): ICD-10-CM

## 2022-12-20 NOTE — TELEPHONE ENCOUNTER
Patient enrolled in our Rx Med Sync service to improve adherence. We are requesting a refill authorization in advance to ensure an active prescription is on file.    Christine Callahan RN .............. 12/20/2022  4:50 PM

## 2022-12-21 RX ORDER — DILTIAZEM HYDROCHLORIDE 120 MG/1
120 CAPSULE, EXTENDED RELEASE ORAL DAILY
Qty: 90 CAPSULE | Refills: 3 | Status: SHIPPED | OUTPATIENT
Start: 2022-12-21 | End: 2023-09-14

## 2022-12-21 NOTE — TELEPHONE ENCOUNTER
"[id number] ([facility])  sent Rx request for the following:      Requested Prescriptions   Pending Prescriptions Disp Refills     diltiazem ER (DILT-XR) 120 MG 24 hr capsule 90 capsule 3     Sig: Take 1 capsule (120 mg) by mouth daily Take 120 mg by mouth once daily       Calcium Channel Blockers Protocol  Failed - 12/20/2022  4:50 PM        Failed - Normal ALT in past 12 months     No lab results found.        Passed - Blood pressure under 140/90 in past 12 months     BP Readings from Last 3 Encounters:   10/24/22 130/88   07/26/22 138/80   07/05/22 136/86           Passed - Recent (12 mo) or future (30 days) visit within the authorizing provider's specialty     Patient has had an office visit with the authorizing provider or a provider within the authorizing providers department within the previous 12 mos or has a future within next 30 days. See \"Patient Info\" tab in inbasket, or \"Choose Columns\" in Meds & Orders section of the refill encounter.          Passed - Medication is active on med list        Passed - Patient is age 18 or older        Passed - No active pregnancy on record        Passed - Normal serum creatinine on file in past 12 months     Recent Labs   Lab Test 10/24/22  1143   CR 0.95     Ok to refill medication if creatinine is low        Passed - No positive pregnancy test in past 12 months     Last Prescription Date:   11/15/21  Last Fill Qty/Refills:         90, R-3  Last Office Visit:              10/24/22   Future Office visit:           None    Tiffany aGyle RN on 12/21/2022 at 4:20 PM      "

## 2023-04-19 ENCOUNTER — LAB (OUTPATIENT)
Dept: LAB | Facility: OTHER | Age: 78
End: 2023-04-19
Attending: INTERNAL MEDICINE
Payer: MEDICARE

## 2023-04-19 DIAGNOSIS — N18.31 STAGE 3A CHRONIC KIDNEY DISEASE (H): ICD-10-CM

## 2023-04-19 DIAGNOSIS — R73.09 ELEVATED GLUCOSE: ICD-10-CM

## 2023-04-19 DIAGNOSIS — E78.5 DYSLIPIDEMIA: ICD-10-CM

## 2023-04-19 DIAGNOSIS — E55.9 VITAMIN D DEFICIENCY: ICD-10-CM

## 2023-04-19 DIAGNOSIS — Z13.29 SCREENING FOR THYROID DISORDER: ICD-10-CM

## 2023-04-19 DIAGNOSIS — E53.8 VITAMIN B12 DEFICIENCY (NON ANEMIC): ICD-10-CM

## 2023-04-19 LAB
ANION GAP SERPL CALCULATED.3IONS-SCNC: 8 MMOL/L (ref 7–15)
BUN SERPL-MCNC: 15.7 MG/DL (ref 8–23)
CALCIUM SERPL-MCNC: 9.5 MG/DL (ref 8.8–10.2)
CHLORIDE SERPL-SCNC: 103 MMOL/L (ref 98–107)
CHOLEST SERPL-MCNC: 141 MG/DL
CREAT SERPL-MCNC: 0.96 MG/DL (ref 0.51–0.95)
DEPRECATED HCO3 PLAS-SCNC: 26 MMOL/L (ref 22–29)
GFR SERPL CREATININE-BSD FRML MDRD: 60 ML/MIN/1.73M2
GLUCOSE SERPL-MCNC: 94 MG/DL (ref 70–99)
HBA1C MFR BLD: 5.4 % (ref 4–6.2)
HDLC SERPL-MCNC: 53 MG/DL
LDLC SERPL CALC-MCNC: 72 MG/DL
NONHDLC SERPL-MCNC: 88 MG/DL
POTASSIUM SERPL-SCNC: 4.7 MMOL/L (ref 3.4–5.3)
SODIUM SERPL-SCNC: 137 MMOL/L (ref 136–145)
T4 FREE SERPL-MCNC: 1.32 NG/DL (ref 0.9–1.7)
TRIGL SERPL-MCNC: 80 MG/DL
TSH SERPL DL<=0.005 MIU/L-ACNC: 5.17 UIU/ML (ref 0.3–4.2)
VIT B12 SERPL-MCNC: >2000 PG/ML (ref 232–1245)

## 2023-04-19 PROCEDURE — 36415 COLL VENOUS BLD VENIPUNCTURE: CPT | Mod: ZL

## 2023-04-19 PROCEDURE — 80048 BASIC METABOLIC PNL TOTAL CA: CPT | Mod: ZL

## 2023-04-19 PROCEDURE — 80061 LIPID PANEL: CPT | Mod: ZL

## 2023-04-19 PROCEDURE — 83036 HEMOGLOBIN GLYCOSYLATED A1C: CPT | Mod: ZL

## 2023-04-19 PROCEDURE — 82306 VITAMIN D 25 HYDROXY: CPT | Mod: ZL

## 2023-04-19 PROCEDURE — 84439 ASSAY OF FREE THYROXINE: CPT | Mod: ZL

## 2023-04-19 PROCEDURE — 82607 VITAMIN B-12: CPT | Mod: ZL

## 2023-04-19 PROCEDURE — 84443 ASSAY THYROID STIM HORMONE: CPT | Mod: ZL

## 2023-04-20 LAB — DEPRECATED CALCIDIOL+CALCIFEROL SERPL-MC: 68 UG/L (ref 20–75)

## 2023-05-01 ENCOUNTER — HOSPITAL ENCOUNTER (OUTPATIENT)
Dept: MAMMOGRAPHY | Facility: OTHER | Age: 78
Discharge: HOME | End: 2023-05-01
Attending: INTERNAL MEDICINE | Admitting: INTERNAL MEDICINE
Payer: MEDICARE

## 2023-05-01 DIAGNOSIS — Z12.31 VISIT FOR SCREENING MAMMOGRAM: ICD-10-CM

## 2023-05-01 PROCEDURE — 77067 SCR MAMMO BI INCL CAD: CPT

## 2023-05-18 DIAGNOSIS — J30.1 SEASONAL ALLERGIC RHINITIS DUE TO POLLEN: ICD-10-CM

## 2023-05-22 ENCOUNTER — OFFICE VISIT (OUTPATIENT)
Dept: FAMILY MEDICINE | Facility: OTHER | Age: 78
End: 2023-05-22
Attending: FAMILY MEDICINE
Payer: COMMERCIAL

## 2023-05-22 VITALS
BODY MASS INDEX: 30.82 KG/M2 | HEART RATE: 54 BPM | SYSTOLIC BLOOD PRESSURE: 132 MMHG | TEMPERATURE: 96.5 F | DIASTOLIC BLOOD PRESSURE: 80 MMHG | OXYGEN SATURATION: 97 % | HEIGHT: 65 IN | RESPIRATION RATE: 16 BRPM | WEIGHT: 185 LBS

## 2023-05-22 DIAGNOSIS — Z00.00 ENCOUNTER FOR MEDICARE ANNUAL WELLNESS EXAM: Primary | ICD-10-CM

## 2023-05-22 DIAGNOSIS — Z13.6 SCREENING FOR AAA (AORTIC ABDOMINAL ANEURYSM): ICD-10-CM

## 2023-05-22 DIAGNOSIS — N39.0 URINARY TRACT INFECTION WITHOUT HEMATURIA, SITE UNSPECIFIED: Primary | ICD-10-CM

## 2023-05-22 DIAGNOSIS — J44.9 CHRONIC OBSTRUCTIVE PULMONARY DISEASE, UNSPECIFIED COPD TYPE (H): ICD-10-CM

## 2023-05-22 DIAGNOSIS — Z23 NEED FOR DIPHTHERIA-TETANUS-PERTUSSIS (TDAP) VACCINE: ICD-10-CM

## 2023-05-22 DIAGNOSIS — E78.2 MIXED HYPERLIPIDEMIA: ICD-10-CM

## 2023-05-22 DIAGNOSIS — E03.8 SUBCLINICAL HYPOTHYROIDISM: ICD-10-CM

## 2023-05-22 DIAGNOSIS — N18.2 CKD (CHRONIC KIDNEY DISEASE) STAGE 2, GFR 60-89 ML/MIN: ICD-10-CM

## 2023-05-22 PROBLEM — N18.31 STAGE 3A CHRONIC KIDNEY DISEASE (H): Status: RESOLVED | Noted: 2021-01-15 | Resolved: 2023-05-22

## 2023-05-22 LAB
ALBUMIN UR-MCNC: NEGATIVE MG/DL
APPEARANCE UR: CLEAR
BACTERIA #/AREA URNS HPF: ABNORMAL /HPF
BILIRUB UR QL STRIP: NEGATIVE
COLOR UR AUTO: ABNORMAL
GLUCOSE UR STRIP-MCNC: NEGATIVE MG/DL
HGB UR QL STRIP: NEGATIVE
KETONES UR STRIP-MCNC: NEGATIVE MG/DL
LEUKOCYTE ESTERASE UR QL STRIP: ABNORMAL
MUCOUS THREADS #/AREA URNS LPF: PRESENT /LPF
NITRATE UR QL: POSITIVE
PH UR STRIP: 6 [PH] (ref 5–9)
RBC URINE: 3 /HPF
SP GR UR STRIP: 1.01 (ref 1–1.03)
TSH SERPL DL<=0.005 MIU/L-ACNC: 3.19 UIU/ML (ref 0.3–4.2)
UROBILINOGEN UR STRIP-MCNC: NORMAL MG/DL
WBC URINE: 30 /HPF

## 2023-05-22 PROCEDURE — 81001 URINALYSIS AUTO W/SCOPE: CPT | Mod: ZL | Performed by: FAMILY MEDICINE

## 2023-05-22 PROCEDURE — 84443 ASSAY THYROID STIM HORMONE: CPT | Mod: ZL | Performed by: FAMILY MEDICINE

## 2023-05-22 PROCEDURE — 87186 SC STD MICRODIL/AGAR DIL: CPT | Mod: ZL | Performed by: FAMILY MEDICINE

## 2023-05-22 PROCEDURE — G0439 PPPS, SUBSEQ VISIT: HCPCS | Performed by: FAMILY MEDICINE

## 2023-05-22 PROCEDURE — G0463 HOSPITAL OUTPT CLINIC VISIT: HCPCS

## 2023-05-22 PROCEDURE — 36415 COLL VENOUS BLD VENIPUNCTURE: CPT | Mod: ZL | Performed by: FAMILY MEDICINE

## 2023-05-22 RX ORDER — ATORVASTATIN CALCIUM 40 MG/1
20 TABLET, FILM COATED ORAL DAILY
Qty: 90 TABLET | Refills: 4 | COMMUNITY
Start: 2023-05-22 | End: 2023-12-30

## 2023-05-22 RX ORDER — MOMETASONE FUROATE 1 MG/G
OINTMENT TOPICAL
COMMUNITY
Start: 2023-04-19

## 2023-05-22 RX ORDER — MONTELUKAST SODIUM 10 MG/1
TABLET ORAL
Qty: 90 TABLET | Refills: 3 | Status: SHIPPED | OUTPATIENT
Start: 2023-05-22 | End: 2024-05-20

## 2023-05-22 RX ORDER — SULFAMETHOXAZOLE/TRIMETHOPRIM 800-160 MG
1 TABLET ORAL 2 TIMES DAILY
Qty: 10 TABLET | Refills: 0 | Status: SHIPPED | OUTPATIENT
Start: 2023-05-22 | End: 2023-05-27

## 2023-05-22 ASSESSMENT — ENCOUNTER SYMPTOMS
HEMATURIA: 0
FEVER: 0
CONSTIPATION: 0
NERVOUS/ANXIOUS: 0
PARESTHESIAS: 0
DIZZINESS: 1
COUGH: 0
HEARTBURN: 0
DYSURIA: 0
CHILLS: 0
FREQUENCY: 0
ABDOMINAL PAIN: 0
BREAST MASS: 0
WEAKNESS: 0
ARTHRALGIAS: 0
JOINT SWELLING: 0
DIARRHEA: 0
SHORTNESS OF BREATH: 1
PALPITATIONS: 0
SORE THROAT: 0
NAUSEA: 0
EYE PAIN: 0
HEMATOCHEZIA: 0
HEADACHES: 0
MYALGIAS: 0

## 2023-05-22 ASSESSMENT — ACTIVITIES OF DAILY LIVING (ADL): CURRENT_FUNCTION: NO ASSISTANCE NEEDED

## 2023-05-22 ASSESSMENT — PAIN SCALES - GENERAL: PAINLEVEL: NO PAIN (0)

## 2023-05-22 NOTE — PROGRESS NOTES
"    The patient was provided with written information regarding signs of hearing loss.  Answers for HPI/ROS submitted by the patient on 5/22/2023  In general, how would you rate your overall physical health?: excellent  Frequency of exercise:: 2-3 days/week  Do you usually eat at least 4 servings of fruit and vegetables a day, include whole grains & fiber, and avoid regularly eating high fat or \"junk\" foods? : Yes  Taking medications regularly:: Yes  Medication side effects:: None  Activities of Daily Living: no assistance needed  Home safety: no safety concerns identified  Hearing Impairment:: difficulty understanding soft or whispered speech  In the past 6 months, have you been bothered by leaking of urine?: No  abdominal pain: No  Blood in stool: No  Blood in urine: No  chest pain: No  chills: No  congestion: No  constipation: No  cough: No  diarrhea: No  dizziness: Yes  ear pain: No  eye pain: No  nervous/anxious: No  fever: No  frequency: No  genital sores: No  headaches: No  hearing loss: Yes  heartburn: No  arthralgias: No  joint swelling: No  peripheral edema: No  mood changes: No  myalgias: No  nausea: No  dysuria: No  palpitations: No  Skin sensation changes: No  sore throat: No  urgency: No  rash: No  shortness of breath: Yes  visual disturbance: No  weakness: No  pelvic pain: No  vaginal bleeding: No  vaginal discharge: No  tenderness: No  breast mass: No  breast discharge: No  In general, how would you rate your overall mental or emotional health?: excellent  Additional concerns today:: No  Duration of exercise:: Less than 15 minutes        "

## 2023-05-22 NOTE — PATIENT INSTRUCTIONS
Patient Education   Personalized Prevention Plan  You are due for the preventive services outlined below.  Your care team is available to assist you in scheduling these services.  If you have already completed any of these items, please share that information with your care team to update in your medical record.  Health Maintenance Due   Topic Date Due     Urine Test  Never done     Diptheria Tetanus Pertussis (DTAP/TDAP/TD) Vaccine (1 - Tdap) 07/09/2010     COVID-19 Vaccine (6 - Moderna series) 03/02/2023       Signs of Hearing Loss  Hearing loss is a problem shared by many people. In fact, it's one of the most common health problems, particularly as people age. Most people aged 65 and older have some hearing loss. By age 80, almost everyone does. Hearing loss often occurs slowly over the years. So, you may not realize your hearing has gotten worse.   When sudden hearing loss occurs, it's important to contact your healthcare provider right away. Your provider will do a medical exam and a hearing exam as soon as possible. This is to help find the cause and type of your sudden hearing loss. Based on your diagnosis, your healthcare provider will discuss possible treatments.      Hearing much better with one ear can be a sign of hearing loss.     Have your hearing checked  Call your healthcare provider if you:     Have to strain to hear normal conversation    Have to watch other people s faces very carefully to follow what they re saying    Need to ask people to repeat what they ve said    Often misunderstand what people are saying    Turn the volume of the television or radio up so high that others complain    Feel that people are mumbling when they re talking to you    Find that the effort to hear leaves you feeling tired and irritated    Notice, when using the phone, that you hear better with one ear than the other  [employer] last reviewed this educational content on 6/1/2022 2000-2022 [employer].  All rights reserved. This information is not intended as a substitute for professional medical care. Always follow your healthcare professional's instructions.

## 2023-05-22 NOTE — LETTER
My COPD Action Plan     Name: Mackenzie Dos Santos    YOB: 1945   Date: 5/22/2023    My doctor: Caryn Alvarenga MD   My clinic: [facility]    [address]  [place] [id number] [phone]  My Controller Medicine:  Singulair   Dose: 10 mg by mouth at bedtime      My Rescue Medicine: Albuterol (Proair/Ventolin/Proventil) inhaler   Dose: 2 puffs every 4-6 hours as needed          My COPD Severity: Moderate = FeV1 < 79% -50%      Use of Oxygen: Oxygen Not Prescribed      Make sure you've had your pneumonia   vaccines.          GREEN ZONE       Doing well today    Usual level of activity and exercise  Usual amount of cough and mucus  No shortness of breath  Usual level of health (thinking clearly, sleeping well, feel like eating) Actions:    Take daily medicines  Use oxygen as prescribed  Follow regular exercise and diet plan  Avoid cigarette smoke and other irritants that harm the lungs           YELLOW ZONE          Having a bad day or flare up    Short of breath more than usual  A lot more sputum (mucus) than usual  Sputum looks yellow, green, tan, brown or bloody  More coughing or wheezing  Fever or chills  Less energy; trouble completing activities  Trouble thinking or focusing  Using quick relief inhaler or nebulizer more often  Poor sleep; symptoms wake me up  Do not feel like eating Actions:    Get plenty of rest  Take daily medicines  Use quick relief inhaler every 4 hours  If you use oxygen, call you doctor to see if you should adjust your oxygen  Do breathing exercises or other things to help you relax  Let a loved one, friend or neighbor know you are feeling worse  Call your care team if you have 2 or more symptoms.  Start taking steroids or antibiotics if directed by your care team           RED ZONE       Need medical care now    Severe shortness of breath (feel you can't breathe)  Fever, chills  Not enough breath to do any activity  Trouble coughing up  mucus, walking or talking  Blood in mucus  Frequent coughing Rescue medicines are not working  Not able to sleep because of breathing  Feel confused or drowsy  Chest pain    Actions:    Call your health care team.  If you cannot reach your care team, call 911 or go to the emergency room.        Annual Reminders:  Meet with Care Team, Flu Shot every Fall  Pharmacy: [id number] ([facility] - [place], [place] - [facility] [place] [place] [address]

## 2023-05-22 NOTE — PROGRESS NOTES
"SUBJECTIVE:   Mackenzie is a 78 year old who presents for Preventive Visit.      5/22/2023     2:02 PM   Additional Questions   Roomed by Adore Reyes   Accompanied by self   Patient has been advised of split billing requirements and indicates understanding: Yes  Are you in the first 12 months of your Medicare coverage?  No    She had documentation that showed she had chronic kidney disease and didn't get to speak to anyone about it and it was a shock to her.    Healthy Habits:     In general, how would you rate your overall health?  Excellent    Frequency of exercise:  2-3 days/week    Duration of exercise:  Less than 15 minutes    Do you usually eat at least 4 servings of fruit and vegetables a day, include whole grains    & fiber and avoid regularly eating high fat or \"junk\" foods?  Yes    Taking medications regularly:  Yes    Medication side effects:  None    Ability to successfully perform activities of daily living:  No assistance needed    Home Safety:  No safety concerns identified    Hearing Impairment:  Difficulty understanding soft or whispered speech    In the past 6 months, have you been bothered by leaking of urine?  No    In general, how would you rate your overall mental or emotional health?  Excellent      PHQ-2 Total Score: 0    Additional concerns today:  No      Have you ever done Advance Care Planning? (For example, a Health Directive, POLST, or a discussion with a medical provider or your loved ones about your wishes): Yes, patient states has an Advance Care Planning document and will bring a copy to the clinic.       Fall risk  Fallen 2 or more times in the past year?: No  Any fall with injury in the past year?: No    Cognitive Screening   1) Repeat 3 items (Leader, Season, Table)    2) Clock draw: NORMAL  3) 3 item recall: Recalls 3 objects  Results: 3 items recalled: COGNITIVE IMPAIRMENT LESS LIKELY    Mini-CogTM Copyright S Doug. Licensed by the author for use in [facility]; " reprinted with permission (soob@.[facility]). All rights reserved.      Do you have sleep apnea, excessive snoring or daytime drowsiness?: no    Reviewed and updated as needed this visit by clinical staff   Tobacco  Allergies  Meds  Problems             Reviewed and updated as needed this visit by Provider    Allergies  Meds  Problems            Social History     Tobacco Use     Smoking status: Former     Packs/day: 1.50     Years: 30.00     Pack years: 45.00     Types: Cigarettes     Quit date: 1990     Years since quittin.4     Smokeless tobacco: Never   Vaping Use     Vaping status: Never Used     Passive vaping exposure: Yes   Substance Use Topics     Alcohol use: Not Currently             2023     1:59 PM   Alcohol Use   Prescreen: >3 drinks/day or >7 drinks/week? No     Do you have a current opioid prescription? No  Do you use any other controlled substances or medications that are not prescribed by a provider? None      Current providers sharing in care for this patient include:   Patient Care Team:  Caryn Alvarenga MD as PCP - General (Family Medicine)  Asher Miller MD as Assigned PCP  [employer] [facility],  as Assigned Heart and Vascular Provider    The following health maintenance items are reviewed in Epic and correct as of today:  Health Maintenance   Topic Date Due     DTAP/TDAP/TD IMMUNIZATION (1 - Tdap) 2010     COVID-19 Vaccine (6 - Moderna series) 2023     MICROALBUMIN  10/24/2023     HEMOGLOBIN  10/24/2023     BMP  2024     LIPID  2024     MEDICARE ANNUAL WELLNESS VISIT  2024     FALL RISK ASSESSMENT  2024     ADVANCE CARE PLANNING  2028     DEXA  2036     SPIROMETRY  Completed     HEPATITIS C SCREENING  Completed     COPD ACTION PLAN  Completed     PHQ-2 (once per calendar year)  Completed     INFLUENZA VACCINE  Completed     Pneumococcal Vaccine: 65+ Years  Completed     URINALYSIS  Completed     ZOSTER  IMMUNIZATION  Completed     IPV IMMUNIZATION  Aged Out     MENINGITIS IMMUNIZATION  Aged Out     MAMMO SCREENING  Discontinued     COLORECTAL CANCER SCREENING  Discontinued     Labs reviewed in EPIC  BP Readings from Last 3 Encounters:   23 132/80   10/24/22 130/88   22 138/80    Wt Readings from Last 3 Encounters:   23 83.9 kg (185 lb)   10/24/22 85.3 kg (188 lb)   22 85.7 kg (189 lb)                  Patient Active Problem List   Diagnosis     PVC's (premature ventricular contractions)     PAC (premature atrial contraction)     Dizziness     Vitamin B12 deficiency (non anemic)     Personal history of tobacco use, presenting hazards to health     Mild intermittent extrinsic asthma without complication     Perennial allergic rhinitis     HERMOSILLO (dyspnea on exertion)     Asthma     Chronic obstructive pulmonary disease, unspecified COPD type (H)     SVT (supraventricular tachycardia) (H)     Vitamin D deficiency     History of tobacco abuse     Frequent PVCs     Heart palpitations     Past Surgical History:   Procedure Laterality Date     APPENDECTOMY       BREAST EXCISIONAL BIOPSY Right     benign     HYSTERECTOMY      ovaries remain     LAPAROSCOPIC CHOLECYSTECTOMY       LUNG BIOPSY      due to chronic cough - benign findings     TONSILLECTOMY         Social History     Tobacco Use     Smoking status: Former     Packs/day: 1.50     Years: 30.00     Pack years: 45.00     Types: Cigarettes     Quit date: 1990     Years since quittin.4     Smokeless tobacco: Never   Vaping Use     Vaping status: Never Used     Passive vaping exposure: Yes   Substance Use Topics     Alcohol use: Not Currently     Family History   Problem Relation Age of Onset     Ulcers Mother      Other - See Comments Mother          of old age     Heart Disease Father          at 74, probably  of massive MI     Breast Cancer Sister 42         at 48 of her lung cancer     Brain Cancer Son          glioblastoma IV     Family History Negative Daughter      Family History Negative Daughter      Colon Cancer No family hx of          Current Outpatient Medications   Medication Sig Dispense Refill     albuterol (PROAIR HFA/PROVENTIL HFA/VENTOLIN HFA) 108 (90 Base) MCG/ACT inhaler Inhale 2 puffs into the lungs every 4 hours as needed for shortness of breath / dyspnea or wheezing 18 g 3     aspirin (ASA) 81 MG EC tablet Take one tablet by mouth every day.       atorvastatin (LIPITOR) 40 MG tablet Take 0.5 tablets (20 mg) by mouth daily 90 tablet 4     cyanocobalamin (VITAMIN B-12) 1000 MCG tablet Take 1 tablet (1,000 mcg) by mouth daily 90 tablet 3     diltiazem ER (DILT-XR) 120 MG 24 hr capsule Take 1 capsule (120 mg) by mouth daily Take 120 mg by mouth once daily 90 capsule 3     fluticasone (FLONASE) 50 MCG/ACT nasal spray SPRAY 2 SPRAYS INTO BOTH NOSTRILS DAILY 16 g 3     mometasone (ELOCON) 0.1 % external ointment APPLY TO ITCHY SPOT IN GROIN TWO TIMES A DAY UNTIL CLEAR. RESTART AS NEEDED.       Spacer/Aero-Holding Chambers (VORTEX VALVED HOLDING CHAMBER) CHICA Use with HFA 1 each 11     Tdap, tetanus-diptheria-acell pertussis, (BOOSTRIX) 5-2.5-18.5 LF-MCG/0.5 DAPHNIE injection Inject 0.5 mLs into the muscle once for 1 dose 0.5 mL 0     Vitamin D (Cholecalciferol) 25 MCG (1000 UT) TABS        montelukast (SINGULAIR) 10 MG tablet TAKE 1 TABLET (10MG) BY MOUTH AT BEDTIME 90 tablet 3     sulfamethoxazole-trimethoprim (BACTRIM DS) 800-160 MG tablet Take 1 tablet by mouth 2 times daily for 5 days 10 tablet 0     No Known Allergies      Mammogram Screening - Patient over age 75, has elected to continue with screening.  Pertinent mammograms are reviewed under the imaging tab.    Review of Systems   Constitutional: Negative for chills and fever.   HENT: Positive for hearing loss. Negative for congestion, ear pain and sore throat.    Eyes: Negative for pain and visual disturbance.   Respiratory: Positive for shortness of  "breath. Negative for cough.    Cardiovascular: Negative for chest pain, palpitations and peripheral edema.   Gastrointestinal: Negative for abdominal pain, constipation, diarrhea, heartburn, hematochezia and nausea.   Breasts:  Negative for tenderness, breast mass and discharge.   Genitourinary: Negative for dysuria, frequency, genital sores, hematuria, pelvic pain, urgency, vaginal bleeding and vaginal discharge.   Musculoskeletal: Negative for arthralgias, joint swelling and myalgias.   Skin: Negative for rash.   Neurological: Positive for dizziness. Negative for weakness, headaches and paresthesias.   Psychiatric/Behavioral: Negative for mood changes. The patient is not nervous/anxious.      Constitutional, HEENT, cardiovascular, pulmonary, GI, , musculoskeletal, neuro, skin, endocrine and psych systems are negative, except as otherwise noted.    OBJECTIVE:   /80   Pulse 54   Temp (!) 96.5  F (35.8  C) (Tympanic)   Resp 16   Ht 1.638 m (5' 4.5\")   Wt 83.9 kg (185 lb)   LMP  (LMP Unknown)   SpO2 97%   Breastfeeding No   BMI 31.26 kg/m   Estimated body mass index is 31.26 kg/m  as calculated from the following:    Height as of this encounter: 1.638 m (5' 4.5\").    Weight as of this encounter: 83.9 kg (185 lb).  Physical Exam  Constitutional:       Appearance: She is well-developed.   HENT:      Head: Normocephalic.      Right Ear: External ear normal.      Left Ear: External ear normal.      Nose: Nose normal.   Eyes:      Pupils: Pupils are equal, round, and reactive to light.   Neck:      Thyroid: No thyromegaly.   Cardiovascular:      Rate and Rhythm: Normal rate and regular rhythm.      Heart sounds: Normal heart sounds. No murmur heard.  Pulmonary:      Effort: Pulmonary effort is normal. No respiratory distress.      Breath sounds: Normal breath sounds. No wheezing or rales.      Comments: Breast exam declined per patient.  Chest:      Chest wall: No tenderness.   Abdominal:      General: " Bowel sounds are normal. There is no distension.      Palpations: Abdomen is soft. There is no mass.      Tenderness: There is no abdominal tenderness. There is no guarding or rebound.   Musculoskeletal:         General: No tenderness or deformity.      Cervical back: Normal range of motion and neck supple.   Lymphadenopathy:      Cervical: No cervical adenopathy.   Skin:     General: Skin is warm and dry.   Neurological:      Mental Status: She is alert and oriented to person, place, and time.      Cranial Nerves: No cranial nerve deficit.      Coordination: Coordination normal.               ASSESSMENT / PLAN:       ICD-10-CM    1. Encounter for Medicare annual wellness exam  Z00.00       2. Need for diphtheria-tetanus-pertussis (Tdap) vaccine  Z23 Tdap, tetanus-diptheria-acell pertussis, (BOOSTRIX) 5-2.5-18.5 LF-MCG/0.5 DAPHNIE injection      3. CKD (chronic kidney disease) stage 2, GFR 60-89 ml/min  N18.2 UA Macroscopic with reflex to Microscopic and Culture     UA Macroscopic with reflex to Microscopic and Culture     Urine Culture      4. Screening for AAA (aortic abdominal aneurysm)  Z13.6 US Aorta Medicare AAA Screening      5. Mixed hyperlipidemia  E78.2 atorvastatin (LIPITOR) 40 MG tablet      6. Subclinical hypothyroidism  E03.8 TSH Reflex GH     TSH Reflex GH      7. Chronic obstructive pulmonary disease, unspecified COPD type (H)  J44.9         1.  Mammogram is up-to-date, last completed 5/1/2023.  DEXA last completed 3/4/2021 and showed osteopenia.  She wishes to wait on repeat of this for the time being.  Colon cancer screening declined due to age greater than 75.  Pap deferred due to age greater than 65.  AAA screening ultrasound ordered.  Flu, pneumococcal, Shingrix vaccines are all up-to-date.  Declines COVID booster today.  2.  She is due for a Tdap and order was sent to her pharmacy.  3.  Discussed that based on her most recent labs, she has stage II chronic kidney disease.  However, discussed that  "this is very mild and its not uncommon to see for a woman of her age.  Discussed that good control of her blood pressure is well as avoiding NSAIDs and keeping hydrated would be the best things that she can do to help prevent further worsening of her chronic kidney disease.  4.  See #1.  5.  Atorvastatin refilled.  She had recent labs a month ago which were stable.  6.  Discussed that her recent TSH was mildly elevated.  She has not been having any symptoms of hypothyroidism at this time discussed rechecking today to see if this is still elevated or has returned back to normal range.  7.  Updated COPD action plan completed today.      Patient has been advised of split billing requirements and indicates understanding: Yes      COUNSELING:  Reviewed preventive health counseling, as reflected in patient instructions       Regular exercise       Healthy diet/nutrition       Vision screening       Hearing screening       Dental care       Osteoporosis prevention/bone health      BMI:   Estimated body mass index is 31.26 kg/m  as calculated from the following:    Height as of this encounter: 1.638 m (5' 4.5\").    Weight as of this encounter: 83.9 kg (185 lb).   Weight management plan: Discussed healthy diet and exercise guidelines      She reports that she quit smoking about 33 years ago. Her smoking use included cigarettes. She has a 45.00 pack-year smoking history. She has never used smokeless tobacco.      Appropriate preventive services were discussed with this patient, including applicable screening as appropriate for cardiovascular disease, diabetes, osteopenia/osteoporosis, and glaucoma.  As appropriate for age/gender, discussed screening for colorectal cancer, prostate cancer, breast cancer, and cervical cancer. Checklist reviewing preventive services available has been given to the patient.    Reviewed patients plan of care and provided an AVS. The Basic Care Plan (routine screening as documented in Health " Maintenance) for Mackenzie meets the Care Plan requirement. This Care Plan has been established and reviewed with the Patient.      Caryn Alvarenga MD  [facility]    Identified Health Risks:    I have reviewed Opioid Use Disorder and Substance Use Disorder risk factors and made any needed referrals.

## 2023-05-22 NOTE — NURSING NOTE
Chief Complaint   Patient presents with     Wellness Visit       Medication Reconciliation: complete    Adore Reyes, LPN

## 2023-05-22 NOTE — TELEPHONE ENCOUNTER
Routing refill request to provider for review/approval because:    LOV: 5/22/23    Celi Fowler RN on 5/22/2023 at 2:50 PM

## 2023-05-24 LAB — BACTERIA UR CULT: ABNORMAL

## 2023-06-08 ENCOUNTER — HOSPITAL ENCOUNTER (OUTPATIENT)
Dept: ULTRASOUND IMAGING | Facility: OTHER | Age: 78
Discharge: HOME | End: 2023-06-08
Attending: FAMILY MEDICINE | Admitting: FAMILY MEDICINE
Payer: MEDICARE

## 2023-06-08 DIAGNOSIS — Z13.6 SCREENING FOR AAA (AORTIC ABDOMINAL ANEURYSM): ICD-10-CM

## 2023-06-08 PROCEDURE — 76706 US ABDL AORTA SCREEN AAA: CPT

## 2023-06-12 DIAGNOSIS — J30.89 PERENNIAL ALLERGIC RHINITIS: ICD-10-CM

## 2023-06-15 RX ORDER — FLUTICASONE PROPIONATE 50 MCG
SPRAY, SUSPENSION (ML) NASAL
Qty: 16 G | Refills: 3 | Status: SHIPPED | OUTPATIENT
Start: 2023-06-15 | End: 2023-09-08

## 2023-06-15 NOTE — TELEPHONE ENCOUNTER
[id number] of [place] sent Rx request for the following:      Requested Prescriptions   Pending Prescriptions Disp Refills     fluticasone (FLONASE) 50 MCG/ACT nasal spray [Pharmacy Med Name: FLUTICASONE 50MCG/ACT SPRAY] 16 g 3     Sig: INSTILL 2 SPRAYS INTO BOTH NOSTRILS DAILY   Last Prescription Date:   12/14/22  Last Fill Qty/Refills:         16 g, R-3    Last Office Visit:              5/22/23  Future Office visit:           None    Per LOV note: Return in about 53 weeks (around 5/27/2024) for Annual Wellness Visit.    Christine Callahan RN .............. 6/15/2023  2:42 PM

## 2023-09-03 DIAGNOSIS — J30.89 PERENNIAL ALLERGIC RHINITIS: ICD-10-CM

## 2023-09-08 RX ORDER — FLUTICASONE PROPIONATE 50 MCG
SPRAY, SUSPENSION (ML) NASAL
Qty: 16 G | Refills: 3 | Status: SHIPPED | OUTPATIENT
Start: 2023-09-08 | End: 2024-01-25

## 2023-09-08 NOTE — TELEPHONE ENCOUNTER
Refill request for fluticasone (FLONASE) 50 MCG/ACT nasal spray from [facility].  Last office visit 5/22/2023, last refill 6/15/2023, medication passes RN Refill protocol, routing to PCP to address refill per protocol.  Corina Dawn RN on 9/8/2023 at 11:35 AM

## 2023-09-14 ENCOUNTER — TELEPHONE (OUTPATIENT)
Dept: FAMILY MEDICINE | Facility: OTHER | Age: 78
End: 2023-09-14
Payer: COMMERCIAL

## 2023-09-14 DIAGNOSIS — I49.1 PAC (PREMATURE ATRIAL CONTRACTION): ICD-10-CM

## 2023-09-14 DIAGNOSIS — I49.3 PVC'S (PREMATURE VENTRICULAR CONTRACTIONS): ICD-10-CM

## 2023-09-14 RX ORDER — DILTIAZEM HYDROCHLORIDE 120 MG/1
120 CAPSULE, EXTENDED RELEASE ORAL DAILY
Qty: 90 CAPSULE | Refills: 3 | Status: SHIPPED | OUTPATIENT
Start: 2023-09-14 | End: 2024-05-20

## 2023-09-14 NOTE — TELEPHONE ENCOUNTER
Called and verified patient full name and . Needs Diltiazem 120mg refilled. Rest of meds are good for a year. Was just seen in May 2023.     Adore Reyes LPN on 2023 at 9:55 AM

## 2023-09-14 NOTE — TELEPHONE ENCOUNTER
States she'll be leaving [place] for 5 months and is wanting to know if [facility] wants her to make an appt before she leaves or if she'll be willing to refill her medication while she's gone

## 2023-09-14 NOTE — TELEPHONE ENCOUNTER
Called and verified patient full name and . Notified of below.     Adore Reyes LPN on 2023 at 1:13 PM

## 2023-12-28 DIAGNOSIS — J45.20 MILD INTERMITTENT EXTRINSIC ASTHMA WITHOUT COMPLICATION: ICD-10-CM

## 2023-12-30 DIAGNOSIS — E78.2 MIXED HYPERLIPIDEMIA: ICD-10-CM

## 2023-12-30 RX ORDER — ATORVASTATIN CALCIUM 40 MG/1
40 TABLET, FILM COATED ORAL DAILY
Qty: 90 TABLET | Refills: 0 | Status: SHIPPED | OUTPATIENT
Start: 2023-12-30 | End: 2024-04-12

## 2023-12-30 RX ORDER — ALBUTEROL SULFATE 90 UG/1
2 AEROSOL, METERED RESPIRATORY (INHALATION) EVERY 4 HOURS PRN
Qty: 18 G | Refills: 11 | Status: SHIPPED | OUTPATIENT
Start: 2023-12-30 | End: 2024-05-20

## 2024-01-21 DIAGNOSIS — J30.89 PERENNIAL ALLERGIC RHINITIS: ICD-10-CM

## 2024-01-24 NOTE — TELEPHONE ENCOUNTER
[id number] ([facility]) of [place] sent Rx request for the following:      Requested Prescriptions   Pending Prescriptions Disp Refills    fluticasone (FLONASE) 50 MCG/ACT nasal spray [Pharmacy Med Name: FLUTICASONE 50MCG/ACT SPRAY] 16 g 3     Sig: INSTILL 2 SPRAYS INTO BOTH NOSTRILS DAILY       Nasal Allergy Protocol Failed - 1/24/2024 10:38 AM        Failed - Recent (12 mo) or future (30 days) visit within the authorizing provider's specialty     The patient must have completed an in-person or virtual visit within the past 12 months or has a future visit scheduled within the next 90 days with the authorizing provider s specialty.  Urgent care and e-visits do not quality as an office visit for this protocol.         Last Prescription Date:   9/8/23  Last Fill Qty/Refills:         16 g, R-3    Last Office Visit:              5/22/23   Future Office visit:           none    Routing refill request to provider for review/approval because:  Drug not on the G refill protocol     Liza Potts RN on 1/24/2024 at 10:40 AM

## 2024-01-25 RX ORDER — FLUTICASONE PROPIONATE 50 MCG
SPRAY, SUSPENSION (ML) NASAL
Qty: 16 G | Refills: 3 | Status: SHIPPED | OUTPATIENT
Start: 2024-01-25 | End: 2024-08-14

## 2024-02-17 NOTE — TELEPHONE ENCOUNTER
[facility] sent Rx request for the following:      Requested Prescriptions   Pending Prescriptions Disp Refills     atorvastatin (LIPITOR) 80 MG tablet [Pharmacy Med Name: ATORVASTATIN 80MG TABLET] 90 tablet 2     Sig: TAKE 1 TABLET BY MOUTH EVERY DAY     diltiazem ER (DILT-XR) 120 MG 24 hr capsule [Pharmacy Med Name: DILTIAZEM 120MG ER CAPSULE] 90 capsule 2     Sig: TAKE 1 CAPSULE BY MOUTH EVERY DAY     Last Prescription Date:   11/15/21  Last Fill Qty/Refills:         90, R-3    Last Office Visit:              7/5/22   Future Office visit:           None   Unable to complete prescription refill per RN Medication Refill Policy. Redundant refill request refused: Too soon:  Ariana Payne RN on 8/25/2022 at 8:23 AM     
No

## 2024-04-09 DIAGNOSIS — E78.2 MIXED HYPERLIPIDEMIA: ICD-10-CM

## 2024-04-11 ENCOUNTER — TELEPHONE (OUTPATIENT)
Dept: FAMILY MEDICINE | Facility: OTHER | Age: 79
End: 2024-04-11
Payer: COMMERCIAL

## 2024-04-11 NOTE — TELEPHONE ENCOUNTER
Please call the patient.  She would like lab orders and an order for UA.        Rosa Montano on 4/11/2024 at 9:23 AM

## 2024-04-11 NOTE — TELEPHONE ENCOUNTER
Called and verified patient full name and . Patient wondering if she can have labs and urine completed at her appt.     She will be fasting. Notified patient that labs requested will be ordered at appt.     Adore Reyes LPN on 2024 at 10:58 AM

## 2024-04-12 RX ORDER — ATORVASTATIN CALCIUM 40 MG/1
TABLET, FILM COATED ORAL
Qty: 90 TABLET | Refills: 0 | Status: SHIPPED | OUTPATIENT
Start: 2024-04-12 | End: 2024-05-20

## 2024-04-16 ENCOUNTER — LAB (OUTPATIENT)
Dept: LAB | Facility: OTHER | Age: 79
End: 2024-04-16
Attending: FAMILY MEDICINE
Payer: MEDICARE

## 2024-04-16 DIAGNOSIS — N39.0 URINARY TRACT INFECTION WITHOUT HEMATURIA, SITE UNSPECIFIED: ICD-10-CM

## 2024-04-16 LAB
ALBUMIN UR-MCNC: NEGATIVE MG/DL
APPEARANCE UR: CLEAR
BILIRUB UR QL STRIP: NEGATIVE
COLOR UR AUTO: YELLOW
GLUCOSE UR STRIP-MCNC: NEGATIVE MG/DL
HGB UR QL STRIP: NEGATIVE
KETONES UR STRIP-MCNC: NEGATIVE MG/DL
LEUKOCYTE ESTERASE UR QL STRIP: NEGATIVE
NITRATE UR QL: NEGATIVE
PH UR STRIP: 6.5 [PH] (ref 5–9)
SP GR UR STRIP: 1 (ref 1–1.03)
UROBILINOGEN UR STRIP-MCNC: NORMAL MG/DL

## 2024-04-16 PROCEDURE — 81003 URINALYSIS AUTO W/O SCOPE: CPT | Mod: ZL

## 2024-05-02 ENCOUNTER — HOSPITAL ENCOUNTER (OUTPATIENT)
Dept: MAMMOGRAPHY | Facility: OTHER | Age: 79
Discharge: HOME | End: 2024-05-02
Attending: FAMILY MEDICINE | Admitting: FAMILY MEDICINE
Payer: MEDICARE

## 2024-05-02 DIAGNOSIS — Z12.31 VISIT FOR SCREENING MAMMOGRAM: ICD-10-CM

## 2024-05-02 PROCEDURE — 77063 BREAST TOMOSYNTHESIS BI: CPT

## 2024-05-20 ENCOUNTER — OFFICE VISIT (OUTPATIENT)
Dept: FAMILY MEDICINE | Facility: OTHER | Age: 79
End: 2024-05-20
Attending: FAMILY MEDICINE
Payer: MEDICARE

## 2024-05-20 VITALS
BODY MASS INDEX: 31.37 KG/M2 | WEIGHT: 185.6 LBS | OXYGEN SATURATION: 97 % | RESPIRATION RATE: 16 BRPM | TEMPERATURE: 97.6 F | SYSTOLIC BLOOD PRESSURE: 128 MMHG | DIASTOLIC BLOOD PRESSURE: 70 MMHG | HEART RATE: 66 BPM

## 2024-05-20 DIAGNOSIS — I49.1 PAC (PREMATURE ATRIAL CONTRACTION): ICD-10-CM

## 2024-05-20 DIAGNOSIS — J30.1 SEASONAL ALLERGIC RHINITIS DUE TO POLLEN: ICD-10-CM

## 2024-05-20 DIAGNOSIS — E55.9 VITAMIN D DEFICIENCY: ICD-10-CM

## 2024-05-20 DIAGNOSIS — I47.10 SVT (SUPRAVENTRICULAR TACHYCARDIA) (H): ICD-10-CM

## 2024-05-20 DIAGNOSIS — Z78.0 POSTMENOPAUSE: ICD-10-CM

## 2024-05-20 DIAGNOSIS — J45.20 MILD INTERMITTENT EXTRINSIC ASTHMA WITHOUT COMPLICATION: ICD-10-CM

## 2024-05-20 DIAGNOSIS — I47.29 PAROXYSMAL VENTRICULAR TACHYCARDIA (H): ICD-10-CM

## 2024-05-20 DIAGNOSIS — I49.3 PVC'S (PREMATURE VENTRICULAR CONTRACTIONS): ICD-10-CM

## 2024-05-20 DIAGNOSIS — E53.8 VITAMIN B12 DEFICIENCY (NON ANEMIC): ICD-10-CM

## 2024-05-20 DIAGNOSIS — N18.2 CKD (CHRONIC KIDNEY DISEASE) STAGE 2, GFR 60-89 ML/MIN: ICD-10-CM

## 2024-05-20 DIAGNOSIS — E78.2 MIXED HYPERLIPIDEMIA: Primary | ICD-10-CM

## 2024-05-20 DIAGNOSIS — Z23 NEED FOR TDAP VACCINATION: ICD-10-CM

## 2024-05-20 DIAGNOSIS — J44.9 CHRONIC OBSTRUCTIVE PULMONARY DISEASE, UNSPECIFIED COPD TYPE (H): ICD-10-CM

## 2024-05-20 DIAGNOSIS — Z13.29 SCREENING FOR THYROID DISORDER: ICD-10-CM

## 2024-05-20 PROBLEM — I47.20 PAROXYSMAL VENTRICULAR TACHYCARDIA (H): Status: ACTIVE | Noted: 2024-05-20

## 2024-05-20 LAB
ALBUMIN SERPL BCG-MCNC: 4.6 G/DL (ref 3.5–5.2)
ALP SERPL-CCNC: 157 U/L (ref 40–150)
ALT SERPL W P-5'-P-CCNC: 20 U/L (ref 0–50)
ANION GAP SERPL CALCULATED.3IONS-SCNC: 7 MMOL/L (ref 7–15)
AST SERPL W P-5'-P-CCNC: 26 U/L (ref 0–45)
BASOPHILS # BLD AUTO: 0.1 10E3/UL (ref 0–0.2)
BASOPHILS NFR BLD AUTO: 1 %
BILIRUB SERPL-MCNC: 1.1 MG/DL
BUN SERPL-MCNC: 17.9 MG/DL (ref 8–23)
CALCIUM SERPL-MCNC: 9.7 MG/DL (ref 8.8–10.2)
CHLORIDE SERPL-SCNC: 105 MMOL/L (ref 98–107)
CHOLEST SERPL-MCNC: 168 MG/DL
CREAT SERPL-MCNC: 1.06 MG/DL (ref 0.51–0.95)
DEPRECATED HCO3 PLAS-SCNC: 28 MMOL/L (ref 22–29)
EGFRCR SERPLBLD CKD-EPI 2021: 53 ML/MIN/1.73M2
EOSINOPHIL # BLD AUTO: 0.3 10E3/UL (ref 0–0.7)
EOSINOPHIL NFR BLD AUTO: 4 %
ERYTHROCYTE [DISTWIDTH] IN BLOOD BY AUTOMATED COUNT: 13.1 % (ref 10–15)
FASTING STATUS PATIENT QL REPORTED: ABNORMAL
FASTING STATUS PATIENT QL REPORTED: NORMAL
GLUCOSE SERPL-MCNC: 96 MG/DL (ref 70–99)
HCT VFR BLD AUTO: 44.8 % (ref 35–47)
HDLC SERPL-MCNC: 57 MG/DL
HGB BLD-MCNC: 14.5 G/DL (ref 11.7–15.7)
IMM GRANULOCYTES # BLD: 0 10E3/UL
IMM GRANULOCYTES NFR BLD: 0 %
LDLC SERPL CALC-MCNC: 93 MG/DL
LYMPHOCYTES # BLD AUTO: 2.9 10E3/UL (ref 0.8–5.3)
LYMPHOCYTES NFR BLD AUTO: 39 %
MCH RBC QN AUTO: 30.7 PG (ref 26.5–33)
MCHC RBC AUTO-ENTMCNC: 32.4 G/DL (ref 31.5–36.5)
MCV RBC AUTO: 95 FL (ref 78–100)
MONOCYTES # BLD AUTO: 0.6 10E3/UL (ref 0–1.3)
MONOCYTES NFR BLD AUTO: 8 %
NEUTROPHILS # BLD AUTO: 3.5 10E3/UL (ref 1.6–8.3)
NEUTROPHILS NFR BLD AUTO: 47 %
NONHDLC SERPL-MCNC: 111 MG/DL
NRBC # BLD AUTO: 0 10E3/UL
NRBC BLD AUTO-RTO: 0 /100
PLATELET # BLD AUTO: 213 10E3/UL (ref 150–450)
POTASSIUM SERPL-SCNC: 4.4 MMOL/L (ref 3.4–5.3)
PROT SERPL-MCNC: 7.7 G/DL (ref 6.4–8.3)
RBC # BLD AUTO: 4.73 10E6/UL (ref 3.8–5.2)
SODIUM SERPL-SCNC: 140 MMOL/L (ref 135–145)
T4 FREE SERPL-MCNC: 1.22 NG/DL (ref 0.9–1.7)
TRIGL SERPL-MCNC: 89 MG/DL
TSH SERPL DL<=0.005 MIU/L-ACNC: 6.29 UIU/ML (ref 0.3–4.2)
VIT B12 SERPL-MCNC: >2000 PG/ML (ref 232–1245)
VIT D+METAB SERPL-MCNC: 53 NG/ML (ref 20–50)
WBC # BLD AUTO: 7.3 10E3/UL (ref 4–11)

## 2024-05-20 PROCEDURE — G0463 HOSPITAL OUTPT CLINIC VISIT: HCPCS

## 2024-05-20 PROCEDURE — 84443 ASSAY THYROID STIM HORMONE: CPT | Mod: ZL | Performed by: FAMILY MEDICINE

## 2024-05-20 PROCEDURE — 85025 COMPLETE CBC W/AUTO DIFF WBC: CPT | Mod: ZL | Performed by: FAMILY MEDICINE

## 2024-05-20 PROCEDURE — 36415 COLL VENOUS BLD VENIPUNCTURE: CPT | Mod: ZL | Performed by: FAMILY MEDICINE

## 2024-05-20 PROCEDURE — 84439 ASSAY OF FREE THYROXINE: CPT | Mod: ZL | Performed by: FAMILY MEDICINE

## 2024-05-20 PROCEDURE — 80061 LIPID PANEL: CPT | Mod: ZL | Performed by: FAMILY MEDICINE

## 2024-05-20 PROCEDURE — 82607 VITAMIN B-12: CPT | Mod: ZL | Performed by: FAMILY MEDICINE

## 2024-05-20 PROCEDURE — 99214 OFFICE O/P EST MOD 30 MIN: CPT | Performed by: FAMILY MEDICINE

## 2024-05-20 PROCEDURE — 82306 VITAMIN D 25 HYDROXY: CPT | Mod: ZL | Performed by: FAMILY MEDICINE

## 2024-05-20 PROCEDURE — 80053 COMPREHEN METABOLIC PANEL: CPT | Mod: ZL | Performed by: FAMILY MEDICINE

## 2024-05-20 RX ORDER — DILTIAZEM HYDROCHLORIDE 120 MG/1
120 CAPSULE, EXTENDED RELEASE ORAL DAILY
Qty: 90 CAPSULE | Refills: 3 | Status: SHIPPED | OUTPATIENT
Start: 2024-05-20

## 2024-05-20 RX ORDER — ATORVASTATIN CALCIUM 40 MG/1
TABLET, FILM COATED ORAL
Qty: 90 TABLET | Refills: 3 | Status: SHIPPED | OUTPATIENT
Start: 2024-05-20

## 2024-05-20 RX ORDER — MONTELUKAST SODIUM 10 MG/1
TABLET ORAL
Qty: 90 TABLET | Refills: 3 | Status: SHIPPED | OUTPATIENT
Start: 2024-05-20

## 2024-05-20 RX ORDER — ALBUTEROL SULFATE 90 UG/1
2 AEROSOL, METERED RESPIRATORY (INHALATION) EVERY 4 HOURS PRN
Qty: 18 G | Refills: 11 | Status: SHIPPED | OUTPATIENT
Start: 2024-05-20

## 2024-05-20 ASSESSMENT — PAIN SCALES - GENERAL: PAINLEVEL: NO PAIN (0)

## 2024-05-20 NOTE — NURSING NOTE
Chief Complaint   Patient presents with    Recheck Medication         Medication Reconciliation: complete    Adore Reyes, LPN

## 2024-05-20 NOTE — PROGRESS NOTES
Nursing Notes:   Adore Reyes LPN  5/20/2024  8:06 AM  Signed  Chief Complaint   Patient presents with    Recheck Medication         Medication Reconciliation: complete    Adore Reyes LPN          Subjective   Mackenzie is a 79 year old, presenting for the following health issues:  Recheck Medication        5/20/2024     8:05 AM   Additional Questions   Roomed by Adore Reyes     EMBER Mann is here today for refill of her medications.  She wanted to discuss her chronic kidney disease.          Review of Systems  Constitutional, HEENT, cardiovascular, pulmonary, GI, , musculoskeletal, neuro, skin, endocrine and psych systems are negative, except as otherwise noted.      Objective    /70   Pulse 66   Temp 97.6  F (36.4  C) (Tympanic)   Resp 16   Wt 84.2 kg (185 lb 9.6 oz)   LMP  (LMP Unknown)   SpO2 97%   Breastfeeding No   BMI 31.37 kg/m    Body mass index is 31.37 kg/m .  Physical Exam  Constitutional:       Appearance: Normal appearance.   HENT:      Head: Normocephalic.   Eyes:      Extraocular Movements: Extraocular movements intact.      Pupils: Pupils are equal, round, and reactive to light.   Cardiovascular:      Rate and Rhythm: Normal rate and regular rhythm.      Heart sounds: Normal heart sounds. No murmur heard.  Pulmonary:      Effort: Pulmonary effort is normal.      Breath sounds: Normal breath sounds. No wheezing, rhonchi or rales.   Musculoskeletal:      Cervical back: Normal range of motion and neck supple.      Right lower leg: No edema.      Left lower leg: No edema.   Lymphadenopathy:      Cervical: No cervical adenopathy.   Neurological:      Mental Status: She is alert.   Psychiatric:         Mood and Affect: Mood normal.         Behavior: Behavior normal.              ICD-10-CM    1. Mixed hyperlipidemia  E78.2 atorvastatin (LIPITOR) 40 MG tablet     Lipid Profile     Comprehensive metabolic panel     Lipid Profile     Comprehensive metabolic panel      2. PVC's  (premature ventricular contractions)  I49.3 diltiazem ER (DILT-XR) 120 MG 24 hr capsule      3. PAC (premature atrial contraction)  I49.1 diltiazem ER (DILT-XR) 120 MG 24 hr capsule      4. Seasonal allergic rhinitis due to pollen  J30.1 montelukast (SINGULAIR) 10 MG tablet      5. Vitamin B12 deficiency (non anemic)  E53.8 CBC with Platelets & Differential     Vitamin B12     CBC with Platelets & Differential     Vitamin B12      6. Vitamin D deficiency  E55.9 Vitamin D Deficiency     Vitamin D Deficiency      7. Postmenopause  Z78.0 DX Bone Density      8. Screening for thyroid disorder  Z13.29 TSH with free T4 reflex     TSH with free T4 reflex      9. Need for Tdap vaccination  Z23 Tdap, tetanus-diptheria-acell pertussis, (BOOSTRIX) 5-2.5-18.5 LF-MCG/0.5 DAPHNIE injection      10. Paroxysmal ventricular tachycardia (H)  I47.29       11. Mild intermittent extrinsic asthma without complication  J45.20 albuterol (PROAIR HFA/PROVENTIL HFA/VENTOLIN HFA) 108 (90 Base) MCG/ACT inhaler      12. Chronic obstructive pulmonary disease, unspecified COPD type (H)  J44.9 albuterol (PROAIR HFA/PROVENTIL HFA/VENTOLIN HFA) 108 (90 Base) MCG/ACT inhaler      13. SVT (supraventricular tachycardia) (H24)  I47.10       14. CKD (chronic kidney disease) stage 2, GFR 60-89 ml/min  N18.2            Stable.  Labs as above.  Refills as above.  Stable.  On diltiazem.  Stable.  On diltiazem.  Refill as above.  Stable.  CBC and B12 levels obtained today as above.  Vitamin D level obtained today as above.  DEXA scan ordered as her last was done 3/4/2021 and showed osteopenia.  TSH as above.  Order for Tdap sent to outside pharmacy.  She declines COVID booster at this time and will consider it in the fall.  Stable.  Diltiazem refilled as noted above.  Stable.  Refills as above.  Stable.  Refills as above.  COPD action plan updated today.  Stable.  Diltiazem refilled as above.  Discussed that her chronic kidney disease is very mild at this time.   Encouraged hydration, healthy eating, exercise, avoidance of NSAIDs, avoidance of tobacco, etc.  Questions were answered.    No follow-ups on file.           Caryn Alvarenga MD

## 2024-05-20 NOTE — LETTER
My COPD Action Plan     Name: Mackenzie Dos Santos    YOB: 1945   Date: 5/20/2024    My doctor: Caryn Alvarenga MD   My clinic: [facility]    [address]  [place] [id number] [phone]  My Controller Medicine:  Singulair    Dose: 10 mg daily     My Rescue Medicine: Albuterol (Proair/Ventolin/Proventil) inhaler   Dose: 2 puffs every 4 hours as needed.         My COPD Severity: Moderate = FeV1 < 79% -50%      Use of Oxygen: Oxygen Not Prescribed      Make sure you've had your pneumonia   vaccines.          GREEN ZONE       Doing well today    Usual level of activity and exercise  Usual amount of cough and mucus  No shortness of breath  Usual level of health (thinking clearly, sleeping well, feel like eating) Actions:    Take daily medicines  Use oxygen as prescribed  Follow regular exercise and diet plan  Avoid cigarette smoke and other irritants that harm the lungs           YELLOW ZONE          Having a bad day or flare up    Short of breath more than usual  A lot more sputum (mucus) than usual  Sputum looks yellow, green, tan, brown or bloody  More coughing or wheezing  Fever or chills  Less energy; trouble completing activities  Trouble thinking or focusing  Using quick relief inhaler or nebulizer more often  Poor sleep; symptoms wake me up  Do not feel like eating Actions:    Get plenty of rest  Take daily medicines  Use quick relief inhaler every 4 hours  If you use oxygen, call you doctor to see if you should adjust your oxygen  Do breathing exercises or other things to help you relax  Let a loved one, friend or neighbor know you are feeling worse  Call your care team if you have 2 or more symptoms.  Start taking steroids or antibiotics if directed by your care team           RED ZONE       Need medical care now    Severe shortness of breath (feel you can't breathe)  Fever, chills  Not enough breath to do any activity  Trouble coughing up mucus, walking or  talking  Blood in mucus  Frequent coughing Rescue medicines are not working  Not able to sleep because of breathing  Feel confused or drowsy  Chest pain    Actions:    Call your health care team.  If you cannot reach your care team, call 911 or go to the emergency room.        Annual Reminders:  Meet with Care Team, Flu Shot every Fall  Pharmacy: [id number] ([employer] - [place], [place] - [facility] [address]

## 2024-05-21 DIAGNOSIS — R74.8 ELEVATED ALKALINE PHOSPHATASE LEVEL: Primary | ICD-10-CM

## 2024-05-21 DIAGNOSIS — E03.8 SUBCLINICAL HYPOTHYROIDISM: ICD-10-CM

## 2024-05-22 ENCOUNTER — TELEPHONE (OUTPATIENT)
Dept: FAMILY MEDICINE | Facility: OTHER | Age: 79
End: 2024-05-22
Payer: COMMERCIAL

## 2024-05-22 DIAGNOSIS — E53.8 VITAMIN B12 DEFICIENCY (NON ANEMIC): Primary | ICD-10-CM

## 2024-05-22 DIAGNOSIS — E55.9 VITAMIN D DEFICIENCY: ICD-10-CM

## 2024-05-22 NOTE — TELEPHONE ENCOUNTER
Patient calling in to inquire if her lab orders could include checking on her Vitamin B and D levels. She is scheduled for her labs on 7/22/24.    Alejandra Woodard on 5/22/2024 at 10:39 AM

## 2024-06-19 ENCOUNTER — HOSPITAL ENCOUNTER (OUTPATIENT)
Dept: BONE DENSITY | Facility: OTHER | Age: 79
Discharge: HOME | End: 2024-06-19
Attending: FAMILY MEDICINE | Admitting: FAMILY MEDICINE
Payer: MEDICARE

## 2024-06-19 ENCOUNTER — MYC MEDICAL ADVICE (OUTPATIENT)
Dept: FAMILY MEDICINE | Facility: OTHER | Age: 79
End: 2024-06-19

## 2024-06-19 DIAGNOSIS — Z78.0 POSTMENOPAUSE: ICD-10-CM

## 2024-06-19 PROCEDURE — 77080 DXA BONE DENSITY AXIAL: CPT

## 2024-06-20 NOTE — TELEPHONE ENCOUNTER
Vitamin D, Total 5/20/24 - 53    Per comment from 5/20/24:    Mackenzie -your vitamin D level was actually slightly high, so you probably could safely decrease the amount of vitamin D you are taking if you are on a supplement.     Your B12 level was over 2000 which indicates that you have been taking a supplement, which is fine.    Nara Zheng RN on 6/20/2024 at 8:00 AM

## 2024-06-24 NOTE — TELEPHONE ENCOUNTER
Given her bone density, I would recommend she take calcium 1200 mg daily as suggested in the result note on her DEXA scan.  If she were taking vitamin D 1000 International Units daily, that should be sufficient.  I'm not sure how much she was taking prior to her recent vitamin D level, but it was a little high.  If she had been in the sun a lot on top of taking vitamin D, this could cause the elevation.  I don't think she'll get into trouble by taking vitamin D 1000 International Units daily.  Caryn Alvarenga MD

## 2024-06-25 NOTE — TELEPHONE ENCOUNTER
My Thoughts:  The additional calcium is being recommended because of your Dexa Scan results and not because of your lab calcium levels, so re-drawing a calcium level at this time wont give us additional necessary information (as your blood levels of calcium were normal with last check).  If you don't feel comfortable increasing your calcium, of course that it totally your decision.     Bess Hassan RN on 6/25/2024 at 10:34 AM

## 2024-06-25 NOTE — TELEPHONE ENCOUNTER
I agree with your thoughts!  There are already standing orders for a B12, vitamin D, alkaline phosphatase isoenzymes and TSH in her chart for her lab appointment.  Caryn Alvarenga MD

## 2024-07-14 ENCOUNTER — HEALTH MAINTENANCE LETTER (OUTPATIENT)
Age: 79
End: 2024-07-14

## 2024-07-22 ENCOUNTER — LAB (OUTPATIENT)
Dept: LAB | Facility: OTHER | Age: 79
End: 2024-07-22
Attending: FAMILY MEDICINE
Payer: MEDICARE

## 2024-07-22 DIAGNOSIS — E55.9 VITAMIN D DEFICIENCY: ICD-10-CM

## 2024-07-22 DIAGNOSIS — R74.8 ELEVATED ALKALINE PHOSPHATASE LEVEL: ICD-10-CM

## 2024-07-22 DIAGNOSIS — E03.8 SUBCLINICAL HYPOTHYROIDISM: ICD-10-CM

## 2024-07-22 DIAGNOSIS — E53.8 VITAMIN B12 DEFICIENCY (NON ANEMIC): ICD-10-CM

## 2024-07-22 LAB
T4 FREE SERPL-MCNC: 1.26 NG/DL (ref 0.9–1.7)
TSH SERPL DL<=0.005 MIU/L-ACNC: 4.42 UIU/ML (ref 0.3–4.2)
VIT B12 SERPL-MCNC: 940 PG/ML (ref 232–1245)
VIT D+METAB SERPL-MCNC: 47 NG/ML (ref 20–50)

## 2024-07-22 PROCEDURE — 82607 VITAMIN B-12: CPT | Mod: ZL

## 2024-07-22 PROCEDURE — 36415 COLL VENOUS BLD VENIPUNCTURE: CPT | Mod: ZL

## 2024-07-22 PROCEDURE — 84439 ASSAY OF FREE THYROXINE: CPT | Mod: ZL

## 2024-07-22 PROCEDURE — 84080 ASSAY ALKALINE PHOSPHATASES: CPT | Mod: ZL

## 2024-07-22 PROCEDURE — 84443 ASSAY THYROID STIM HORMONE: CPT | Mod: ZL

## 2024-07-22 PROCEDURE — 82306 VITAMIN D 25 HYDROXY: CPT | Mod: ZL

## 2024-07-24 LAB
ALP BONE SERPL-CCNC: 74 U/L
ALP LIVER SERPL-CCNC: 74 U/L
ALP OTHER SERPL-CCNC: 0 U/L
ALP SERPL-CCNC: 147 U/L

## 2024-07-25 DIAGNOSIS — R74.8 ELEVATED ALKALINE PHOSPHATASE LEVEL: Primary | ICD-10-CM

## 2024-07-30 ENCOUNTER — MYC MEDICAL ADVICE (OUTPATIENT)
Dept: FAMILY MEDICINE | Facility: OTHER | Age: 79
End: 2024-07-30

## 2024-07-30 ENCOUNTER — HOSPITAL ENCOUNTER (OUTPATIENT)
Dept: NUCLEAR MEDICINE | Facility: OTHER | Age: 79
Discharge: HOME | End: 2024-07-30
Attending: FAMILY MEDICINE
Payer: MEDICARE

## 2024-07-30 ENCOUNTER — HOSPITAL ENCOUNTER (OUTPATIENT)
Dept: NUCLEAR MEDICINE | Facility: OTHER | Age: 79
Setting detail: NUCLEAR MEDICINE
Discharge: HOME | End: 2024-07-30
Attending: FAMILY MEDICINE
Payer: MEDICARE

## 2024-07-30 DIAGNOSIS — R74.8 ELEVATED ALKALINE PHOSPHATASE LEVEL: Primary | ICD-10-CM

## 2024-07-30 DIAGNOSIS — R74.8 ELEVATED ALKALINE PHOSPHATASE LEVEL: ICD-10-CM

## 2024-07-30 PROCEDURE — 78306 BONE IMAGING WHOLE BODY: CPT | Mod: MG

## 2024-07-30 PROCEDURE — 343N000001 HC RX 343: Performed by: FAMILY MEDICINE

## 2024-07-30 PROCEDURE — A9503 TC99M MEDRONATE: HCPCS | Performed by: FAMILY MEDICINE

## 2024-07-30 RX ORDER — TC 99M MEDRONATE 20 MG/10ML
27 INJECTION, POWDER, LYOPHILIZED, FOR SOLUTION INTRAVENOUS ONCE
Status: COMPLETED | OUTPATIENT
Start: 2024-07-30 | End: 2024-07-30

## 2024-07-30 RX ADMIN — TC 99M MEDRONATE 27 MILLICURIE: 20 INJECTION, POWDER, LYOPHILIZED, FOR SOLUTION INTRAVENOUS at 10:05

## 2024-07-31 NOTE — TELEPHONE ENCOUNTER
Per Comment from labs 7/22/24:    Mackenzie -your alkaline phosphatase isoenzyme test showed that your bone component is a bit elevated.  I would recommend having a bone scan to evaluate this further.  I have ordered this test for you.  You should be getting a phone call to set this up.  If you have questions, please let me know.  MD Nara Anderson RN on 7/31/2024 at 9:01 AM

## 2024-08-08 NOTE — TELEPHONE ENCOUNTER
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  I would like Endocrinology input regarding this as it is a condition that should be treated to prevent worsening if this is indeed what she has.  I would recommend either being seen at [facility] or [facility].  Either would be fine.  Caryn Alvarenga MD

## 2024-08-08 NOTE — TELEPHONE ENCOUNTER
Pt was wondering if [facility] has any other suggestions regarding the Endocrinology referral.  Pt is also wondering if she should start taking B12 again.  Can send a [employer] message to respond.    Susan Shaikh on 8/8/2024 at 4:12 PM

## 2024-08-12 DIAGNOSIS — J30.89 PERENNIAL ALLERGIC RHINITIS: ICD-10-CM

## 2024-08-14 RX ORDER — FLUTICASONE PROPIONATE 50 MCG
SPRAY, SUSPENSION (ML) NASAL
Qty: 16 G | Refills: 0 | Status: SHIPPED | OUTPATIENT
Start: 2024-08-14

## 2024-08-14 NOTE — TELEPHONE ENCOUNTER
[id number] ([employer]) of [place] sent Rx request for the following:      Requested Prescriptions   Pending Prescriptions Disp Refills    fluticasone (FLONASE) 50 MCG/ACT nasal spray [Pharmacy Med Name: FLUTICASONE 50MCG/ACT SPRAY] 16 g 3     Sig: INSTILL 2 SPRAYS INTO BOTH NOSTRILS DAILY   Last Prescription Date:   1/25/24  Last Fill Qty/Refills:         16 g, R-3    Last Office Visit:                5/20/24 5/22/23 (Physical)    Future Office visit:           None    Pt due for annual exam. Routing to provider for refill consideration. Routing to Unit scheduling pool, to assist Pt in scheduling appointment. Unable to complete prescription refill per RN Medication Refill Policy. Routing to covering provider for refill consideration, as PCP/provider is out of clinic >48 hours or Pt is completely out of medication and provider is out of the clinic today. Christine Callahan RN .............. 8/14/2024  1:45 PM

## 2024-08-15 NOTE — TELEPHONE ENCOUNTER
[id number] ([employer]) of [place] sent Rx request for the following:      Requested Prescriptions   Pending Prescriptions Disp Refills    atorvastatin (LIPITOR) 40 MG tablet [Pharmacy Med Name: ATORVASTATIN 40MG TABLET] 90 tablet 0     Sig: TAKE 1 TABLET (40 MG) BY MOUTH EVERY DAY       Antihyperlipidemic agents Passed - 4/9/2024 11:57 AM        Passed - LDL on file in the past 12 months        Passed - Medication is active on med list        Passed - Recent (12 mo) or future (90 days) visit within the authorizing provider's specialty     The patient must have completed an in-person or virtual visit within the past 12 months or has a future visit scheduled within the next 90 days with the authorizing provider s specialty.  Urgent care and e-visits do not quality as an office visit for this protocol.          Passed - Patient is age 18 years or older        Passed - No active pregnancy on record        Passed - No positive pregnancy test in past 12 mos           Last Prescription Date:   12/30/23  Last Fill Qty/Refills:         90, R-0    Last Office Visit:              5/22/23 L[place]    Future Office visit:           none noted   Routing refill request to provider for review/approval because:  Patient will be due for annual wellness. Will rout to scheduling too.   Amisha Haywood RN ....................  4/12/2024   11:01 AM     [FreeTextEntry1] : wbat using brace ice/elevate nsaids prn PT f/up 4 wks

## 2024-09-30 ENCOUNTER — MYC MEDICAL ADVICE (OUTPATIENT)
Dept: FAMILY MEDICINE | Facility: OTHER | Age: 79
End: 2024-09-30
Payer: COMMERCIAL

## 2024-10-08 ENCOUNTER — TELEPHONE (OUTPATIENT)
Dept: FAMILY MEDICINE | Facility: OTHER | Age: 79
End: 2024-10-08
Payer: COMMERCIAL

## 2024-10-08 ENCOUNTER — LAB (OUTPATIENT)
Dept: LAB | Facility: OTHER | Age: 79
End: 2024-10-08
Attending: FAMILY MEDICINE
Payer: MEDICARE

## 2024-10-08 DIAGNOSIS — R30.0 BURNING WITH URINATION: ICD-10-CM

## 2024-10-08 DIAGNOSIS — R30.0 BURNING WITH URINATION: Primary | ICD-10-CM

## 2024-10-08 DIAGNOSIS — N39.0 URINARY TRACT INFECTION WITHOUT HEMATURIA, SITE UNSPECIFIED: Primary | ICD-10-CM

## 2024-10-08 LAB
ALBUMIN UR-MCNC: NEGATIVE MG/DL
APPEARANCE UR: ABNORMAL
BACTERIA #/AREA URNS HPF: ABNORMAL /HPF
BILIRUB UR QL STRIP: NEGATIVE
COLOR UR AUTO: YELLOW
GLUCOSE UR STRIP-MCNC: NEGATIVE MG/DL
HGB UR QL STRIP: NEGATIVE
KETONES UR STRIP-MCNC: NEGATIVE MG/DL
LEUKOCYTE ESTERASE UR QL STRIP: ABNORMAL
NITRATE UR QL: POSITIVE
PH UR STRIP: 6 [PH] (ref 5–9)
RBC URINE: 6 /HPF
SP GR UR STRIP: 1.01 (ref 1–1.03)
UROBILINOGEN UR STRIP-MCNC: NORMAL MG/DL
WBC URINE: >182 /HPF

## 2024-10-08 PROCEDURE — 81001 URINALYSIS AUTO W/SCOPE: CPT | Mod: ZL

## 2024-10-08 PROCEDURE — 87186 SC STD MICRODIL/AGAR DIL: CPT | Mod: ZL

## 2024-10-08 RX ORDER — SULFAMETHOXAZOLE AND TRIMETHOPRIM 800; 160 MG/1; MG/1
1 TABLET ORAL 2 TIMES DAILY
Qty: 14 TABLET | Refills: 0 | Status: SHIPPED | OUTPATIENT
Start: 2024-10-08 | End: 2024-10-10

## 2024-10-08 NOTE — TELEPHONE ENCOUNTER
Called and verified patient full name and . Notified patient of below. Transferred to scheduling.     Adore Reyes LPN on 10/8/2024 at 11:33 AM

## 2024-10-08 NOTE — TELEPHONE ENCOUNTER
Called and verified patient full name and . States she has burning upon urination. Started 4-5 days ago.     [facility]     Wondering if she can get a UA order or if she needs to be seen.     Adore Reyes LPN on 10/8/2024 at 10:21 AM

## 2024-10-08 NOTE — TELEPHONE ENCOUNTER
Patient is wondering if she can just drop off a urine sample for medication for a bladder infection or if she needs to schedule an office visit to do so.     Reason for call: Request a lab order.    Order requested for what kind of lab? Urine sample     Who is your PCP? [facility]    Preferred method for responding to this message: Telephone Call    Phone number patient can be reached at: Home number on file [phone] (home)    If we cannot reach you directly, may we leave a detailed response at the number you provided? Yes     Aure Krishnamurthy on 10/8/2024 at 9:54 AM

## 2024-10-10 DIAGNOSIS — N39.0 URINARY TRACT INFECTION WITHOUT HEMATURIA, SITE UNSPECIFIED: Primary | ICD-10-CM

## 2024-10-10 LAB — BACTERIA UR CULT: ABNORMAL

## 2024-10-10 RX ORDER — NITROFURANTOIN 25; 75 MG/1; MG/1
100 CAPSULE ORAL 2 TIMES DAILY
Qty: 14 CAPSULE | Refills: 0 | Status: SHIPPED | OUTPATIENT
Start: 2024-10-10 | End: 2024-10-17

## 2024-10-22 ENCOUNTER — LAB (OUTPATIENT)
Dept: LAB | Facility: OTHER | Age: 79
End: 2024-10-22
Attending: FAMILY MEDICINE
Payer: MEDICARE

## 2024-10-22 DIAGNOSIS — N39.0 URINARY TRACT INFECTION WITHOUT HEMATURIA, SITE UNSPECIFIED: ICD-10-CM

## 2024-10-22 LAB
ALBUMIN UR-MCNC: NEGATIVE MG/DL
APPEARANCE UR: CLEAR
BILIRUB UR QL STRIP: NEGATIVE
COLOR UR AUTO: NORMAL
GLUCOSE UR STRIP-MCNC: NEGATIVE MG/DL
HGB UR QL STRIP: NEGATIVE
KETONES UR STRIP-MCNC: NEGATIVE MG/DL
LEUKOCYTE ESTERASE UR QL STRIP: NEGATIVE
NITRATE UR QL: NEGATIVE
PH UR STRIP: 5.5 [PH] (ref 5–9)
SP GR UR STRIP: 1.01 (ref 1–1.03)
UROBILINOGEN UR STRIP-MCNC: NORMAL MG/DL

## 2024-10-22 PROCEDURE — 87186 SC STD MICRODIL/AGAR DIL: CPT | Mod: ZL

## 2024-10-22 PROCEDURE — 81003 URINALYSIS AUTO W/O SCOPE: CPT | Mod: ZL

## 2024-10-23 DIAGNOSIS — N39.0 URINARY TRACT INFECTION WITHOUT HEMATURIA, SITE UNSPECIFIED: Primary | ICD-10-CM

## 2024-10-23 RX ORDER — SULFAMETHOXAZOLE AND TRIMETHOPRIM 800; 160 MG/1; MG/1
1 TABLET ORAL 2 TIMES DAILY
Qty: 14 TABLET | Refills: 0 | Status: SHIPPED | OUTPATIENT
Start: 2024-10-23 | End: 2024-10-24

## 2024-10-24 DIAGNOSIS — N39.0 URINARY TRACT INFECTION WITHOUT HEMATURIA, SITE UNSPECIFIED: Primary | ICD-10-CM

## 2024-10-24 LAB — BACTERIA UR CULT: ABNORMAL

## 2024-10-24 RX ORDER — NITROFURANTOIN 25; 75 MG/1; MG/1
100 CAPSULE ORAL 2 TIMES DAILY
Qty: 14 CAPSULE | Refills: 0 | Status: SHIPPED | OUTPATIENT
Start: 2024-10-24 | End: 2024-10-31

## 2024-10-25 ENCOUNTER — MYC MEDICAL ADVICE (OUTPATIENT)
Dept: FAMILY MEDICINE | Facility: OTHER | Age: 79
End: 2024-10-25
Payer: COMMERCIAL

## 2024-10-25 NOTE — TELEPHONE ENCOUNTER
10/22/24  UC    10/24/24  Rx changed to 7 day course of Nitrofurantoin.     Patient is wanting to get re-tested on 10/28 before leaving for [place] but she will not be done with her Abx Rx.     There are already future orders in for UA/UC but I'm assuming it would not be recommended to test her urine in the middle of her treatment correct?  She would be 4-5 days in to 7 day treatment.      Bess Hassan RN on 10/25/2024 at 10:17 AM

## 2024-10-28 ENCOUNTER — LAB (OUTPATIENT)
Dept: LAB | Facility: OTHER | Age: 79
End: 2024-10-28
Attending: PHYSICIAN ASSISTANT
Payer: MEDICARE

## 2024-10-28 DIAGNOSIS — J30.89 PERENNIAL ALLERGIC RHINITIS: ICD-10-CM

## 2024-10-28 DIAGNOSIS — N39.0 URINARY TRACT INFECTION WITHOUT HEMATURIA, SITE UNSPECIFIED: ICD-10-CM

## 2024-10-28 LAB
ALBUMIN UR-MCNC: NEGATIVE MG/DL
APPEARANCE UR: CLEAR
BILIRUB UR QL STRIP: NEGATIVE
COLOR UR AUTO: YELLOW
GLUCOSE UR STRIP-MCNC: NEGATIVE MG/DL
HGB UR QL STRIP: NEGATIVE
KETONES UR STRIP-MCNC: NEGATIVE MG/DL
LEUKOCYTE ESTERASE UR QL STRIP: NEGATIVE
NITRATE UR QL: NEGATIVE
PH UR STRIP: 5.5 [PH] (ref 5–9)
SP GR UR STRIP: 1.01 (ref 1–1.03)
UROBILINOGEN UR STRIP-MCNC: NORMAL MG/DL

## 2024-10-28 PROCEDURE — 87086 URINE CULTURE/COLONY COUNT: CPT | Mod: ZL

## 2024-10-28 PROCEDURE — 81003 URINALYSIS AUTO W/O SCOPE: CPT | Mod: ZL

## 2024-10-28 RX ORDER — FLUTICASONE PROPIONATE 50 MCG
SPRAY, SUSPENSION (ML) NASAL
Qty: 16 G | Refills: 0 | Status: SHIPPED | OUTPATIENT
Start: 2024-10-28

## 2024-10-30 LAB — BACTERIA UR CULT: NO GROWTH

## 2024-12-16 DIAGNOSIS — J30.89 PERENNIAL ALLERGIC RHINITIS: ICD-10-CM

## 2024-12-19 RX ORDER — FLUTICASONE PROPIONATE 50 MCG
SPRAY, SUSPENSION (ML) NASAL
Qty: 16 G | Refills: 0 | Status: SHIPPED | OUTPATIENT
Start: 2024-12-19

## 2024-12-19 NOTE — TELEPHONE ENCOUNTER
[id number] ([employer]) of [place] sent Rx request for the following:      Requested Prescriptions   Pending Prescriptions Disp Refills    fluticasone (FLONASE) 50 MCG/ACT nasal spray [Pharmacy Med Name: FLUTICASONE 50MCG/ACT SPRAY] 16 g 0     Sig: INSTILL 2 SPRAYS INTO BOTH NOSTRILS DAILY       Nasal Allergy Protocol Passed - 12/19/2024  1:50 PM        Passed - Patient is age 12 or older        Passed - Medication is active on med list        Passed - Recent (12 mo) or future (90 days) visit within the authorizing provider's specialty     The patient must have completed an in-person or virtual visit within the past 12 months or has a future visit scheduled within the next 90 days with the authorizing provider s specialty.  Urgent care and e-visits do not qualify as an office visit for this protocol.          Passed - Medication indicated for associated diagnosis     Medication is associated with one or more of the following diagnoses:   Allergic conjunctivitis  Allergies  Nasal congestion  Nasal discharge  Rhinitis  Sinus congestion  Recurrent acute maxillary sinusitis  Environmental allergies   Acute sinusitis   Cystic Fibrosis  Bronchiectasis               Last Prescription Date:   10/28/24  Last Fill Qty/Refills:         16 g, R-0    Last Office Visit:              5/20/24   Future Office visit:           none    Prescription approved per [facility] Refill Protocol.  Liza Potts RN on 12/19/2024 at 1:51 PM

## 2025-01-14 ENCOUNTER — TELEPHONE (OUTPATIENT)
Dept: FAMILY MEDICINE | Facility: OTHER | Age: 80
End: 2025-01-14
Payer: COMMERCIAL

## 2025-01-14 DIAGNOSIS — J01.00 ACUTE NON-RECURRENT MAXILLARY SINUSITIS: Primary | ICD-10-CM

## 2025-01-14 NOTE — TELEPHONE ENCOUNTER
Patient states that she has a sinus infection again and is wanting a RX for augmentin for 10 days to treat. Please call    Kami Yang on 1/14/2025 at 1:08 PM

## 2025-01-14 NOTE — TELEPHONE ENCOUNTER
Called and informed patient of Adore Prakash's response and patient verbalized understanding.    Patient verbalized appreciation.  Celi Fowler RN on 1/14/2025 at 3:27 PM

## 2025-01-14 NOTE — TELEPHONE ENCOUNTER
S-(situation): patient calling and states she has a sinus infection and is looking for Rx for Augmentin 750 mg for 10 days    B-(background): patient states she has been getting  sinus infections for a long time.  States this one started about 1 week ago.    A-(assessment): patient states she has a headache, pain around eyes.  States teeth hurt.  States she has black circles under her eyes.  States is having yellowish/greenish/brownish discharge.  States is running low grade temp.  Patient states that the Augmentin 750 mg for 10 days seems to help it.  States 7 days does not clear it.  Patient looking for Augmentin Rx.    R-(recommendations): informed patient will route to Dr Clint Jones for review and advise.  Celi Fowler RN on 1/14/2025 at 1:33 PM

## 2025-01-14 NOTE — TELEPHONE ENCOUNTER
Ok, I sent augmentin. However, I would advise the patient wait a few more days and only take the antibiotic if she has facial pain/ tenderness, thick yellow/green nasal discharge that is not improving etc. It is possible this is caused by a virus and antibiotics are not necessary if she has a viral sinus infection.     Please let the patient know that we generally do require a visit and this is a one - time occrrence.       Sincerely,   Adore Prakash NP on 1/14/2025 at 3:12 PM

## 2025-01-14 NOTE — TELEPHONE ENCOUNTER
Called and informed patient of Adore Prakash's response and patient states they are traveling on their way home to [place] from [place] and will be arriving in about 4 days.    Patient states she has had a long history of sinus infections and would appreciate if Augmentin Rx could be sent in.    Celi Fowler RN on 1/14/2025 at 3:03 PM

## 2025-01-14 NOTE — TELEPHONE ENCOUNTER
I would recommend the patient be seen. You could offer her an appointment with me tomorrow afternoon.   Adore Prakash NP on 1/14/2025 at 1:40 PM

## 2025-02-07 DIAGNOSIS — J30.89 PERENNIAL ALLERGIC RHINITIS: ICD-10-CM

## 2025-02-11 RX ORDER — FLUTICASONE PROPIONATE 50 MCG
SPRAY, SUSPENSION (ML) NASAL
Qty: 16 G | Refills: 0 | Status: SHIPPED | OUTPATIENT
Start: 2025-02-11

## 2025-02-11 NOTE — TELEPHONE ENCOUNTER
[id number] ([employer]) of [place] sent Rx request for the following:      Requested Prescriptions   Pending Prescriptions Disp Refills    fluticasone (FLONASE) 50 MCG/ACT nasal spray [Pharmacy Med Name: FLUTICASONE 50MCG/ACT SPRAY] 16 g 0     Sig: INSTILL 2 SPRAYS INTO BOTH NOSTRILS DAILY       Nasal Allergy Protocol Passed - 2/11/2025  9:53 AM        Passed - Patient is age 12 or older        Passed - Medication is active on med list        Passed - Recent (12 mo) or future (90 days) visit within the authorizing provider's specialty     The patient must have completed an in-person or virtual visit within the past 12 months or has a future visit scheduled within the next 90 days with the authorizing provider s specialty.  Urgent care and e-visits do not qualify as an office visit for this protocol.          Passed - Medication indicated for associated diagnosis     Medication is associated with one or more of the following diagnoses:   Allergic conjunctivitis  Allergies  Nasal congestion  Nasal discharge  Rhinitis  Sinus congestion  Recurrent acute maxillary sinusitis  Environmental allergies   Acute sinusitis   Cystic Fibrosis  Bronchiectasis               Last Prescription Date:   12/19/24  Last Fill Qty/Refills:         16 g, R-0    Last Office Visit:              5/20/24   Future Office visit:           none    Prescription approved per [facility] Refill Protocol.  Liza Potts RN on 2/11/2025 at 9:54 AM

## 2025-02-21 DIAGNOSIS — J30.1 SEASONAL ALLERGIC RHINITIS DUE TO POLLEN: ICD-10-CM

## 2025-02-26 RX ORDER — MONTELUKAST SODIUM 10 MG/1
TABLET ORAL
Qty: 90 TABLET | Refills: 0 | Status: SHIPPED | OUTPATIENT
Start: 2025-02-26

## 2025-02-26 NOTE — TELEPHONE ENCOUNTER
[id number] ([employer] of [place] sent Rx request for the following:      Requested Prescriptions   Pending Prescriptions Disp Refills    montelukast (SINGULAIR) 10 MG tablet [Pharmacy Med Name: MONTELUKAST 10MG TABLET] 90 tablet 3     Sig: TAKE 1 TABLET (10MG) BY MOUTH AT BEDTIME   Last Prescription Date:   5/20/24  Last Fill Qty/Refills:         90, R-3    Last Office Visit:              5/20/24   Future Office visit:           None    Prescription refilled per RN Medication Refill Policy.................... Christine Callahan RN ....................  2/26/2025   2:09 PM

## 2025-03-05 DIAGNOSIS — E78.2 MIXED HYPERLIPIDEMIA: ICD-10-CM

## 2025-03-05 DIAGNOSIS — I49.3 PVC'S (PREMATURE VENTRICULAR CONTRACTIONS): ICD-10-CM

## 2025-03-05 DIAGNOSIS — I49.1 PAC (PREMATURE ATRIAL CONTRACTION): ICD-10-CM

## 2025-03-06 RX ORDER — DILTIAZEM HYDROCHLORIDE 120 MG/1
120 CAPSULE, EXTENDED RELEASE ORAL DAILY
Qty: 90 CAPSULE | Refills: 3 | OUTPATIENT
Start: 2025-03-06

## 2025-03-06 RX ORDER — ATORVASTATIN CALCIUM 40 MG/1
TABLET, FILM COATED ORAL
Qty: 90 TABLET | Refills: 3 | OUTPATIENT
Start: 2025-03-06

## 2025-03-06 NOTE — TELEPHONE ENCOUNTER
diltiazem ER (DILT-XR) 120 MG 24 hr capsule 90 capsule 3 5/20/2024 -- No   Sig - Route: Take 1 capsule (120 mg) by mouth daily Take 120 mg by mouth once daily - Ora      Disp Refills Start End JANA   atorvastatin (LIPITOR) 40 MG tablet 90 tablet 3 5/20/2024 -- No   Sig: TAKE 1 TABLET (40 MG) BY MOUTH EVERY DAY     [facility]    Refill request to soon.    Celi Fowler RN on 3/6/2025 at 2:35 PM

## 2025-03-17 DIAGNOSIS — E78.2 MIXED HYPERLIPIDEMIA: ICD-10-CM

## 2025-03-17 DIAGNOSIS — I49.3 PVC'S (PREMATURE VENTRICULAR CONTRACTIONS): ICD-10-CM

## 2025-03-17 DIAGNOSIS — I49.1 PAC (PREMATURE ATRIAL CONTRACTION): ICD-10-CM

## 2025-03-17 RX ORDER — ATORVASTATIN CALCIUM 40 MG/1
TABLET, FILM COATED ORAL
Qty: 90 TABLET | Refills: 3 | OUTPATIENT
Start: 2025-03-17

## 2025-03-17 RX ORDER — DILTIAZEM HYDROCHLORIDE 120 MG/1
120 CAPSULE, EXTENDED RELEASE ORAL DAILY
Qty: 90 CAPSULE | Refills: 3 | OUTPATIENT
Start: 2025-03-17

## 2025-03-17 NOTE — TELEPHONE ENCOUNTER
[id number] ([employer]) of [place] sent Rx request for the following:        diltiazem ER (DILT-XR) 120 MG 24 hr capsule 90 capsule 3 5/20/2024 -- No   Sig - Route: Take 1 capsule (120 mg) by mouth daily Take 120 mg by mouth once daily - Ora        Disp Refills Start End JANA   atorvastatin (LIPITOR) 40 MG tablet 90 tablet 3 5/20/2024 -- No   Sig: TAKE 1 TABLET (40 MG) BY MOUTH EVERY DAY      [id number] ([employer]) - [place], [place] - [address]        Refill request to early.    Liza Potts RN on 3/17/2025 at 3:57 PM

## 2025-04-08 ENCOUNTER — OFFICE VISIT (OUTPATIENT)
Dept: FAMILY MEDICINE | Facility: OTHER | Age: 80
End: 2025-04-08
Attending: FAMILY MEDICINE
Payer: MEDICARE

## 2025-04-08 ENCOUNTER — HOSPITAL ENCOUNTER (OUTPATIENT)
Dept: ULTRASOUND IMAGING | Facility: OTHER | Age: 80
Discharge: HOME | End: 2025-04-08
Attending: FAMILY MEDICINE
Payer: MEDICARE

## 2025-04-08 VITALS
RESPIRATION RATE: 18 BRPM | OXYGEN SATURATION: 97 % | SYSTOLIC BLOOD PRESSURE: 184 MMHG | DIASTOLIC BLOOD PRESSURE: 94 MMHG | HEART RATE: 57 BPM | TEMPERATURE: 97.4 F

## 2025-04-08 DIAGNOSIS — M79.604 PAIN OF RIGHT LOWER EXTREMITY: Primary | ICD-10-CM

## 2025-04-08 DIAGNOSIS — M79.604 PAIN OF RIGHT LOWER EXTREMITY: ICD-10-CM

## 2025-04-08 PROCEDURE — 93971 EXTREMITY STUDY: CPT | Mod: RT

## 2025-04-08 PROCEDURE — G0463 HOSPITAL OUTPT CLINIC VISIT: HCPCS | Mod: 25

## 2025-04-08 RX ORDER — METHYLPREDNISOLONE 4 MG/1
TABLET ORAL
Qty: 21 TABLET | Refills: 0 | Status: SHIPPED | OUTPATIENT
Start: 2025-04-08

## 2025-04-08 RX ORDER — CYCLOBENZAPRINE HCL 5 MG
5 TABLET ORAL 3 TIMES DAILY PRN
Qty: 30 TABLET | Refills: 0 | Status: SHIPPED | OUTPATIENT
Start: 2025-04-08

## 2025-04-08 RX ORDER — ALENDRONATE SODIUM 70 MG/1
TABLET ORAL
COMMUNITY

## 2025-04-08 ASSESSMENT — ENCOUNTER SYMPTOMS: BACK PAIN: 1

## 2025-04-08 ASSESSMENT — PAIN SCALES - GENERAL: PAINLEVEL_OUTOF10: SEVERE PAIN (9)

## 2025-04-08 NOTE — NURSING NOTE
"Chief Complaint   Patient presents with    Back Pain     Patient presents to the clinic for pain in her sciatic area. She states it has been going on for a couple months. She has tried chiropractic and massage, neither helped. She states it has gotten much worse in the last week. She can't put her right leg down with out being in a lot of pain.     Initial BP (!) 184/94 (BP Location: Right arm, Patient Position: Sitting, Cuff Size: Adult Regular)   Pulse 57   Temp 97.4  F (36.3  C) (Tympanic)   Resp 18   LMP  (LMP Unknown)   SpO2 97%  Estimated body mass index is 31.37 kg/m  as calculated from the following:    Height as of 5/22/23: 1.638 m (5' 4.5\").    Weight as of 5/20/24: 84.2 kg (185 lb 9.6 oz).  Medication Review: complete    The next two questions are to help us understand your food security.  If you are feeling you need any assistance in this area, we have resources available to support you today.          5/20/2024   SDOH- Food Insecurity   Within the past 12 months, did you worry that your food would run out before you got money to buy more? N   Within the past 12 months, did the food you bought just not last and you didn t have money to get more? N         Health Care Directive:  Patient does not have a Health Care Directive: Discussed advance care planning with patient; however, patient declined at this time.    Leela Ivy, JOEL      "

## 2025-04-08 NOTE — PATIENT INSTRUCTIONS
Ibuprofen - 4 tabs 3 times a day    Or     Aleve - 2 tabs 2 times a day    ----------------------------------------------------------      Tylenol - 1000 mg 4 times a day

## 2025-04-08 NOTE — PROGRESS NOTES
Assessment & Plan     Pain of right lower extremity  Appears most consistent with right S1 radiculopathy.  With some of the travel recently and the excessive pain especially into the hamstring and calf area, I did go ahead with ultrasound lower extremity to rule out DVT.  That was normal with no DVT.  Go ahead with Medrol Dosepak, Flexeril and then okay to use ibuprofen or Aleve with proper dosing given to patient along with Tylenol.  She is aware of potential sedation side effect of Flexeril.  She is aware of potential side effects of the Medrol Dosepak typically seen in long-term use.  Lumbar MRI for further evaluation with recommendations on further treatment based on that.  Still can see chiropractor.  Follow-up if worsening in the meantime.  - US Lower Extremity Venous Duplex Right; Future  - methylPREDNISolone (MEDROL DOSEPAK) 4 MG tablet therapy pack; Follow Package Directions  - cyclobenzaprine (FLEXERIL) 5 MG tablet; Take 1 tablet (5 mg) by mouth 3 times daily as needed for muscle spasms.  - MR Lumbar Spine w/o Contrast; Future                No follow-ups on file.    Taya Mann is a 80 year old, presenting for the following health issues:  Back Pain        4/8/2025     9:59 AM   Additional Questions   Roomed by Leela Ivy LPN     History of Present Illness       Reason for visit:  Sciadic  Symptom onset:  More than a month  Symptoms include:  Hugepain  Symptom intensity:  Severe  Symptom progression:  Worsening  Had these symptoms before:  No  What makes it worse:  Sitting laying  What makes it better:  Standing   She is taking medications regularly.      80-year-old female here for right buttock and posterior leg pain.  She is here with  today.  This has been going on actually for 2 months.  There was no specific injury that really set it off.  She actually was down in [place] for 5 months, which is typical for their wintering situation.  She was just playing cards sometimes sitting  "for 2 to 3 hours at a time and started feeling like there was pain in the upper right posterior leg where it \"knotted up\" with moderately severe pain.  That continued to just bother her off-and-on over the last 2 months, but then really flared up last week while she was doing some vacuuming.  She did not feel like she strained or stepped funny.  At any rate, it has been a severe constant achy and gnawing type pain starting in the right buttock and going right down the posterior leg all the way down to the foot.  She will get some tingling with that at times.  No problems on the left.  She will get a little bit of low back pain off and on, but not currently bothering her.  She did have left sided sciatica a few years ago, but that was treated with conservative treatment with chiropractic treatment with resolution.  This has been much more severe and on the right side this time.      She did see chiropractor and that has not helped.  She was concerned about potential blood clot as she drove from [place] now with her  last week.  She had stopped multiple times to stretch out.  She has significant increased pain with sitting and with any kind of knee flexion.  It feels a lot better if she has her right leg extended.  No swelling.  No other skin changes.  No other complaints.  She has only taken Tylenol as needed for pain without much benefit.  She has had some more recent diagnosis of kidney disease so she has not been taken NSAIDs as a result of this.  Her last creatinine was 1.06 last May.  She does have a nephrology appointment pending in [place].  No bladder or bowel incontinence.    I have reviewed the patient's medical history in detail and updated the computerized patient record.                  Objective    BP (!) 184/94 (BP Location: Right arm, Patient Position: Sitting, Cuff Size: Adult Regular)   Pulse 57   Temp 97.4  F (36.3  C) (Tympanic)   Resp 18   LMP  (LMP Unknown)   SpO2 97%   There is no " height or weight on file to calculate BMI.  Physical Exam   GENERAL: alert, but in noticeable pain, no acute distress; standing is the most comfortable position for her  MS: no gross musculoskeletal defects noted, no edema; tenderness diffusely of the right hamstring and upper calf.  No overlying skin changes or swelling.  Good distal pulses.  No dependent edema.  No low back tenderness.  Muscle strength L4, L5, S1 distribution normal bilaterally.  DTRs 0+ patella and Achilles bilaterally.  Straight leg raise positive on the right, negative on the left.                Signed Electronically by: Arsenio Wright MD

## 2025-04-24 ENCOUNTER — HOSPITAL ENCOUNTER (OUTPATIENT)
Dept: MRI IMAGING | Facility: OTHER | Age: 80
Discharge: HOME | End: 2025-04-24
Attending: FAMILY MEDICINE
Payer: MEDICARE

## 2025-04-24 DIAGNOSIS — M79.604 PAIN OF RIGHT LOWER EXTREMITY: ICD-10-CM

## 2025-04-24 PROCEDURE — 72148 MRI LUMBAR SPINE W/O DYE: CPT

## 2025-05-01 DIAGNOSIS — E78.2 MIXED HYPERLIPIDEMIA: ICD-10-CM

## 2025-05-01 DIAGNOSIS — I49.1 PAC (PREMATURE ATRIAL CONTRACTION): ICD-10-CM

## 2025-05-01 DIAGNOSIS — I49.3 PVC'S (PREMATURE VENTRICULAR CONTRACTIONS): ICD-10-CM

## 2025-05-01 RX ORDER — ATORVASTATIN CALCIUM 40 MG/1
40 TABLET, FILM COATED ORAL
Qty: 90 TABLET | Refills: 0 | Status: SHIPPED | OUTPATIENT
Start: 2025-05-01

## 2025-05-01 RX ORDER — DILTIAZEM HYDROCHLORIDE 120 MG/1
120 CAPSULE, EXTENDED RELEASE ORAL DAILY
Qty: 90 CAPSULE | Refills: 0 | Status: SHIPPED | OUTPATIENT
Start: 2025-05-01

## 2025-05-01 NOTE — TELEPHONE ENCOUNTER
[id number] ([employer]) of [facility] sent Rx request for the following:      Requested Prescriptions   Pending Prescriptions Disp Refills    atorvastatin (LIPITOR) 40 MG tablet [Pharmacy Med Name: ATORVASTATIN 40MG TABLET] 90 tablet 3     Sig: TAKE 1 TABLET (40 MG) BY MOUTH EVERY DAY   Last Prescription Date:   5/20/24  Last Fill Qty/Refills:         90, R-3        DILT- MG 24 hr capsule [Pharmacy Med Name: DILT-XR 120MG CAPSULE] 90 capsule 3     Sig: TAKE 1 CAPSULE (120 MG) BY MOUTH DAILY   Last Prescription Date:   5/20/24  Last Fill Qty/Refills:         90, R-3      Calcium Channel Blockers Protocol  Failed - 5/1/2025  1:58 PM        Failed - Most recent blood pressure under 140/90 in past 12 months     BP Readings from Last 3 Encounters:   04/08/25 (!) 184/94   05/20/24 128/70   05/22/23 132/80   No data recorded        Failed - Medication is indicated for associated diagnosis        Failed - GFR is on file in the past 12 months and most recent GFR is normal     Last Office Visit:                4/8/25 5/20/24 (discussed)    Future Office visit:           5/12/25    Unable to complete prescription refill per RN Medication Refill Policy. Christine Callahan, Refill RN .............. 5/1/2025  2:01 PM

## 2025-05-05 ENCOUNTER — HOSPITAL ENCOUNTER (OUTPATIENT)
Dept: MAMMOGRAPHY | Facility: OTHER | Age: 80
Discharge: HOME | End: 2025-05-05
Attending: FAMILY MEDICINE | Admitting: FAMILY MEDICINE
Payer: MEDICARE

## 2025-05-05 DIAGNOSIS — Z12.31 VISIT FOR SCREENING MAMMOGRAM: ICD-10-CM

## 2025-05-05 PROCEDURE — 77063 BREAST TOMOSYNTHESIS BI: CPT | Mod: 26 | Performed by: RADIOLOGY

## 2025-05-05 PROCEDURE — 77063 BREAST TOMOSYNTHESIS BI: CPT

## 2025-05-05 PROCEDURE — 77067 SCR MAMMO BI INCL CAD: CPT | Mod: 26 | Performed by: RADIOLOGY

## 2025-05-12 ENCOUNTER — OFFICE VISIT (OUTPATIENT)
Dept: FAMILY MEDICINE | Facility: OTHER | Age: 80
End: 2025-05-12
Attending: FAMILY MEDICINE
Payer: COMMERCIAL

## 2025-05-12 VITALS
WEIGHT: 182 LBS | HEART RATE: 57 BPM | DIASTOLIC BLOOD PRESSURE: 76 MMHG | HEIGHT: 64 IN | SYSTOLIC BLOOD PRESSURE: 136 MMHG | BODY MASS INDEX: 31.07 KG/M2 | RESPIRATION RATE: 16 BRPM | TEMPERATURE: 97.8 F | OXYGEN SATURATION: 96 %

## 2025-05-12 DIAGNOSIS — N83.8 OVARIAN MASS: ICD-10-CM

## 2025-05-12 DIAGNOSIS — I47.10 SVT (SUPRAVENTRICULAR TACHYCARDIA): Primary | ICD-10-CM

## 2025-05-12 DIAGNOSIS — J30.1 SEASONAL ALLERGIC RHINITIS DUE TO POLLEN: ICD-10-CM

## 2025-05-12 DIAGNOSIS — M54.41 ACUTE BILATERAL LOW BACK PAIN WITH RIGHT-SIDED SCIATICA: ICD-10-CM

## 2025-05-12 LAB
ATRIAL RATE - MUSE: 56 BPM
DIASTOLIC BLOOD PRESSURE - MUSE: NORMAL MMHG
INTERPRETATION ECG - MUSE: NORMAL
P AXIS - MUSE: 56 DEGREES
PR INTERVAL - MUSE: 120 MS
QRS DURATION - MUSE: 88 MS
QT - MUSE: 444 MS
QTC - MUSE: 439 MS
R AXIS - MUSE: -50 DEGREES
SYSTOLIC BLOOD PRESSURE - MUSE: NORMAL MMHG
T AXIS - MUSE: -22 DEGREES
VENTRICULAR RATE- MUSE: 59 BPM

## 2025-05-12 PROCEDURE — 1125F AMNT PAIN NOTED PAIN PRSNT: CPT | Performed by: FAMILY MEDICINE

## 2025-05-12 PROCEDURE — 3075F SYST BP GE 130 - 139MM HG: CPT | Performed by: FAMILY MEDICINE

## 2025-05-12 PROCEDURE — 3078F DIAST BP <80 MM HG: CPT | Performed by: FAMILY MEDICINE

## 2025-05-12 PROCEDURE — 99214 OFFICE O/P EST MOD 30 MIN: CPT | Performed by: FAMILY MEDICINE

## 2025-05-12 PROCEDURE — 93005 ELECTROCARDIOGRAM TRACING: CPT | Performed by: FAMILY MEDICINE

## 2025-05-12 PROCEDURE — G0463 HOSPITAL OUTPT CLINIC VISIT: HCPCS

## 2025-05-12 RX ORDER — MONTELUKAST SODIUM 10 MG/1
TABLET ORAL
Qty: 90 TABLET | Refills: 3 | Status: SHIPPED | OUTPATIENT
Start: 2025-05-12

## 2025-05-12 ASSESSMENT — PAIN SCALES - GENERAL: PAINLEVEL_OUTOF10: MODERATE PAIN (5)

## 2025-05-12 NOTE — NURSING NOTE
"Chief Complaint   Patient presents with    RECHECK     After MRI   She would like to follow up to see what to do about her right hip pain after her MRI.  Margret Grimaldo LPN..................5/12/2025   3:17 PM        Initial /76   Pulse 57   Temp 97.8  F (36.6  C) (Temporal)   Resp 16   Ht 1.632 m (5' 4.25\")   Wt 82.6 kg (182 lb)   LMP  (LMP Unknown)   SpO2 96%   Breastfeeding No   BMI 31.00 kg/m   Estimated body mass index is 31 kg/m  as calculated from the following:    Height as of this encounter: 1.632 m (5' 4.25\").    Weight as of this encounter: 82.6 kg (182 lb).  Medication Review: complete    The next two questions are to help us understand your food security.  If you are feeling you need any assistance in this area, we have resources available to support you today.          5/20/2024   SDOH- Food Insecurity   Within the past 12 months, did you worry that your food would run out before you got money to buy more? N   Within the past 12 months, did the food you bought just not last and you didn t have money to get more? N        Data saved with a previous flowsheet row definition         Health Care Directive:  Patient does not have a Health Care Directive: Discussed advance care planning with patient; however, patient declined at this time.    Margret Grimaldo LPN      "

## 2025-05-12 NOTE — PROGRESS NOTES
"  Assessment & Plan     SVT (supraventricular tachycardia)  Continued and worsening symptoms with history of previous workup in 2022 as below.  EKG without acute findings today.  Referral back to cardiology.  She will follow-up if anything worsens or changes.  - EKG 12-lead, tracing only (Same Day)  - Adult Cardiology [facility] Referral; Future    Acute bilateral low back pain with right-sided sciatica  Slight improvement, but concern for right L4-L5 nerve root impingement on right L4 nerve root with symptoms consistent with that.  Referral for L4-L5 were AYSE.  Follow-up if worsening or not getting better.  Consideration of physical therapy or spinal specialist evaluation if needed.  - IR Referral; Future    Seasonal allergic rhinitis due to pollen  Refilled Singulair for the next year.  - montelukast (SINGULAIR) 10 MG tablet; TAKE 1 TABLET (10MG) BY MOUTH AT BEDTIME    Ovarian mass  Ultrasound for further evaluation left ovarian cystic mass seen on the MRI.  - US Pelvic Complete with Transvaginal; Future          BMI  Estimated body mass index is 31 kg/m  as calculated from the following:    Height as of this encounter: 1.632 m (5' 4.25\").    Weight as of this encounter: 82.6 kg (182 lb).             Taya Mann is a 80 year old, presenting for the following health issues:  RECHECK (After MRI)        5/12/2025     3:07 PM   Additional Questions   Roomed by Margret Grimaldo LPN     History of Present Illness       Reason for visit:  Sciatic   She is taking medications regularly.      80-year-old female here for follow-up of low back pain with right sided radicular symptoms.  I had seen her last month with significant low back pain and right lower extremity pain.  MRI performed on 4/24/2025 showed expected disc bulging with degenerative changes, but did have more moderate symmetric disc bulge and severe to facet degenerative hypertrophy and spinal stenosis worse on the right at L4-L5 with concern for right L4 " nerve root impingement.  This has been consistent with her symptoms of right lower back pain with radiation of pain right into the buttocks with sharp pain and tingling shooting all the way down to her big toe.  That will be worse with movement.  This has been mild to moderate in severity, constant with the low back pain, but intermittent with the right leg symptoms.  No bowel or bladder incontinence.  No left lower extremity radicular symptoms.  She has been taking Flexeril 5 mg 3 times daily, which helps a little bit with the pain.  The Medrol Dosepak as prescribed last month did help a fair amount, but not fully resolved.  She would like to go ahead potentially with cortisone injection as we had recommended after the MRI.    She then also was noted to have a 3 cm cystic ovarian mass on the left adnexa with the MRI on 4/24/2025.  They had recommended ultrasound for further evaluation.    She then also has some recurrent palpitation issues.  She did have previous evaluation back in 2022 where she had evaluation for palpitations and possible atrial fibrillation.  This was diagnosed as SVT with Zio patch, EKGs, and stress echocardiogram along with cardiology consultation at that time.  She is now at about 3-4 episodes a week again where they can last for few seconds to 10 to 20 seconds.  She will feel weak during the episode, but no associated chest pain, dizziness, or diaphoresis.  She then will have a little shortness of breath shortly after the episode.  She had her fit bit showed that one of the episodes in the middle the night that woke her up actually showed heart rate of 149 with potential for atrial fibrillation at other times.  She would like to be referred back to cardiology.  No other complaints.  She is otherwise doing well.    I have reviewed the patient's medical history in detail and updated the computerized patient record.                  Objective    /76   Pulse 57   Temp 97.8  F (36.6  C)  "(Temporal)   Resp 16   Ht 1.632 m (5' 4.25\")   Wt 82.6 kg (182 lb)   LMP  (LMP Unknown)   SpO2 96%   Breastfeeding No   BMI 31.00 kg/m    Body mass index is 31 kg/m .  Physical Exam   GENERAL: alert and no distress  RESP: lungs clear to auscultation - no rales, rhonchi or wheezes  CV: regular rate and rhythm, normal S1 S2, no S3 or S4, no murmur, click or rub, no peripheral edema   MS: no gross musculoskeletal defects noted, no edema    EKG - Reviewed and interpreted by me appears normal, NSR, LAD, no LVH by voltage criteria, unchanged from previous tracings        Signed Electronically by: Arsenio Wright MD    "

## 2025-05-15 ENCOUNTER — HOSPITAL ENCOUNTER (OUTPATIENT)
Dept: ULTRASOUND IMAGING | Facility: HOSPITAL | Age: 80
End: 2025-05-15
Attending: FAMILY MEDICINE
Payer: MEDICARE

## 2025-05-15 DIAGNOSIS — N83.8 OVARIAN MASS: ICD-10-CM

## 2025-05-15 PROCEDURE — 76830 TRANSVAGINAL US NON-OB: CPT

## 2025-05-15 PROCEDURE — 76830 TRANSVAGINAL US NON-OB: CPT | Mod: 26 | Performed by: RADIOLOGY

## 2025-05-15 PROCEDURE — 76856 US EXAM PELVIC COMPLETE: CPT | Mod: 26 | Performed by: RADIOLOGY

## 2025-05-16 ENCOUNTER — RESULTS FOLLOW-UP (OUTPATIENT)
Dept: FAMILY MEDICINE | Facility: OTHER | Age: 80
End: 2025-05-16
Payer: COMMERCIAL

## 2025-05-16 DIAGNOSIS — N83.8 OVARIAN MASS: Primary | ICD-10-CM

## 2025-05-19 NOTE — TELEPHONE ENCOUNTER
Discussed   Can make appt  For tratment, aslo  Instructed to read \"HOrmone Balance made simple\". [employer]'s ins just changed to HMO, may  Need to pursue this with new. PCP.    Would like to pursue MRI of pelvis.     Cyrus'd up order for MR Pelvis with and w/o contrast.    Routing to provider to review and respond.  Nara Zheng RN on 5/19/2025 at 8:13 AM

## 2025-06-03 ENCOUNTER — HOSPITAL ENCOUNTER (OUTPATIENT)
Dept: GENERAL RADIOLOGY | Facility: OTHER | Age: 80
Discharge: HOME | End: 2025-06-03
Attending: FAMILY MEDICINE
Payer: MEDICARE

## 2025-06-03 ENCOUNTER — TELEPHONE (OUTPATIENT)
Dept: FAMILY MEDICINE | Facility: OTHER | Age: 80
End: 2025-06-03
Payer: COMMERCIAL

## 2025-06-03 ENCOUNTER — MYC MEDICAL ADVICE (OUTPATIENT)
Dept: FAMILY MEDICINE | Facility: OTHER | Age: 80
End: 2025-06-03

## 2025-06-03 ENCOUNTER — HOSPITAL ENCOUNTER (OUTPATIENT)
Dept: MRI IMAGING | Facility: OTHER | Age: 80
Discharge: HOME | End: 2025-06-03
Attending: FAMILY MEDICINE
Payer: MEDICARE

## 2025-06-03 DIAGNOSIS — M48.061 LUMBAR FORAMINAL STENOSIS: ICD-10-CM

## 2025-06-03 DIAGNOSIS — Z13.29 SCREENING FOR THYROID DISORDER: ICD-10-CM

## 2025-06-03 DIAGNOSIS — Z00.00 PREVENTATIVE HEALTH CARE: Primary | ICD-10-CM

## 2025-06-03 DIAGNOSIS — M54.41 ACUTE BILATERAL LOW BACK PAIN WITH RIGHT-SIDED SCIATICA: ICD-10-CM

## 2025-06-03 DIAGNOSIS — M47.26 OTHER SPONDYLOSIS WITH RADICULOPATHY, LUMBAR REGION: ICD-10-CM

## 2025-06-03 DIAGNOSIS — E53.8 VITAMIN B12 DEFICIENCY (NON ANEMIC): ICD-10-CM

## 2025-06-03 DIAGNOSIS — F40.240 CLAUSTROPHOBIA: Primary | ICD-10-CM

## 2025-06-03 DIAGNOSIS — E55.9 VITAMIN D DEFICIENCY: ICD-10-CM

## 2025-06-03 DIAGNOSIS — E78.2 MIXED HYPERLIPIDEMIA: ICD-10-CM

## 2025-06-03 DIAGNOSIS — N83.8 OVARIAN MASS: ICD-10-CM

## 2025-06-03 PROCEDURE — 255N000002 HC RX 255 OP 636: Performed by: RADIOLOGY

## 2025-06-03 PROCEDURE — 255N000002 HC RX 255 OP 636: Performed by: FAMILY MEDICINE

## 2025-06-03 PROCEDURE — 250N000011 HC RX IP 250 OP 636: Performed by: RADIOLOGY

## 2025-06-03 PROCEDURE — 72197 MRI PELVIS W/O & W/DYE: CPT

## 2025-06-03 PROCEDURE — 64483 NJX AA&/STRD TFRM EPI L/S 1: CPT

## 2025-06-03 PROCEDURE — 250N000009 HC RX 250: Performed by: RADIOLOGY

## 2025-06-03 PROCEDURE — 72197 MRI PELVIS W/O & W/DYE: CPT | Mod: 26 | Performed by: RADIOLOGY

## 2025-06-03 PROCEDURE — A9575 INJ GADOTERATE MEGLUMI 0.1ML: HCPCS | Performed by: FAMILY MEDICINE

## 2025-06-03 RX ORDER — ALPRAZOLAM 0.5 MG
0.5 TABLET ORAL
Qty: 1 TABLET | Refills: 0 | Status: SHIPPED | OUTPATIENT
Start: 2025-06-03

## 2025-06-03 RX ORDER — DEXAMETHASONE SODIUM PHOSPHATE 10 MG/ML
10 INJECTION, SOLUTION INTRAMUSCULAR; INTRAVENOUS ONCE
Status: COMPLETED | OUTPATIENT
Start: 2025-06-03 | End: 2025-06-03

## 2025-06-03 RX ORDER — LIDOCAINE HYDROCHLORIDE 10 MG/ML
2 INJECTION, SOLUTION INFILTRATION; PERINEURAL ONCE
Status: COMPLETED | OUTPATIENT
Start: 2025-06-03 | End: 2025-06-03

## 2025-06-03 RX ORDER — LIDOCAINE HYDROCHLORIDE 10 MG/ML
2 INJECTION, SOLUTION EPIDURAL; INFILTRATION; INTRACAUDAL; PERINEURAL ONCE
Status: COMPLETED | OUTPATIENT
Start: 2025-06-03 | End: 2025-06-03

## 2025-06-03 RX ADMIN — DEXAMETHASONE SODIUM PHOSPHATE 10 MG: 10 INJECTION, SOLUTION INTRAMUSCULAR; INTRAVENOUS at 13:28

## 2025-06-03 RX ADMIN — LIDOCAINE HYDROCHLORIDE 2 ML: 10 INJECTION, SOLUTION EPIDURAL; INFILTRATION; INTRACAUDAL; PERINEURAL at 13:29

## 2025-06-03 RX ADMIN — IOHEXOL 1 ML: 240 INJECTION, SOLUTION INTRATHECAL; INTRAVASCULAR; INTRAVENOUS; ORAL at 13:29

## 2025-06-03 RX ADMIN — LIDOCAINE HYDROCHLORIDE 1 ML: 10 INJECTION, SOLUTION INFILTRATION; PERINEURAL at 13:29

## 2025-06-03 RX ADMIN — GADOTERATE MEGLUMINE 17 ML: 376.9 INJECTION INTRAVENOUS at 12:52

## 2025-06-03 NOTE — TELEPHONE ENCOUNTER
I sent medicine for her this morning when this was brought to my attention. Hopefully she got it and had her MRI.    I have signed lab orders prior to upcoming appointment with [facility].       Adore Prakash NP on 6/3/2025 at 3:13 PM

## 2025-06-03 NOTE — TELEPHONE ENCOUNTER
After reviewing the PDMP I have sent a 1 time dose of 0.5 mg xanax not to be refilled - to take prior to MRI . Please let her know no driving after this and be VERY careful until the medication wears off as this medication is not generally recommended to use regularly in elderly patients.     Adore Prakash NP on 6/3/2025 at 11:23 AM

## 2025-06-03 NOTE — TELEPHONE ENCOUNTER
"Wants lab orders placed for \"complete bloodwork\" to be done before upcoming appt on 6/5.    Cyrus'd up orders for CBC/CMP/Lipid/TSH with reflex/Vit b/Vit D/A1C.     Routing to provider to review and respond.  Nara Zheng RN on 6/3/2025 at 2:57 PM    "

## 2025-06-03 NOTE — TELEPHONE ENCOUNTER
Called and spoke to Patient after verifying last name and date of birth. Informed patient of information below. Patient had questions regarding where the medication was sent as well as how long it kicks in. Patients questions answered and she had no other questions or concerns.     Glen Meza RN on 6/3/2025 at 11:27 AM

## 2025-06-03 NOTE — TELEPHONE ENCOUNTER
Patient is scheduled to have Mri in 30 mins. Patient is claustrophobic and wanting a medication for doing her MR. [facility] is currently with a patient would you mind prescribing something for patient.     See telephone encounter from 5/28.     Glen Meza RN on 6/3/2025 at 11:18 AM

## 2025-06-04 ENCOUNTER — RESULTS FOLLOW-UP (OUTPATIENT)
Dept: FAMILY MEDICINE | Facility: OTHER | Age: 80
End: 2025-06-04

## 2025-06-05 ENCOUNTER — OFFICE VISIT (OUTPATIENT)
Dept: FAMILY MEDICINE | Facility: OTHER | Age: 80
End: 2025-06-05
Attending: FAMILY MEDICINE
Payer: MEDICARE

## 2025-06-05 ENCOUNTER — RESULTS FOLLOW-UP (OUTPATIENT)
Dept: FAMILY MEDICINE | Facility: OTHER | Age: 80
End: 2025-06-05

## 2025-06-05 VITALS
OXYGEN SATURATION: 96 % | DIASTOLIC BLOOD PRESSURE: 80 MMHG | WEIGHT: 185.4 LBS | SYSTOLIC BLOOD PRESSURE: 126 MMHG | TEMPERATURE: 97.3 F | RESPIRATION RATE: 16 BRPM | HEART RATE: 60 BPM | BODY MASS INDEX: 31.58 KG/M2

## 2025-06-05 DIAGNOSIS — M47.26 OTHER SPONDYLOSIS WITH RADICULOPATHY, LUMBAR REGION: ICD-10-CM

## 2025-06-05 DIAGNOSIS — E55.9 VITAMIN D DEFICIENCY: ICD-10-CM

## 2025-06-05 DIAGNOSIS — M79.604 PAIN OF RIGHT LOWER EXTREMITY: ICD-10-CM

## 2025-06-05 DIAGNOSIS — I47.20 PAROXYSMAL VENTRICULAR TACHYCARDIA (H): ICD-10-CM

## 2025-06-05 DIAGNOSIS — I49.3 PVC'S (PREMATURE VENTRICULAR CONTRACTIONS): ICD-10-CM

## 2025-06-05 DIAGNOSIS — M85.80 OSTEOPENIA, UNSPECIFIED LOCATION: ICD-10-CM

## 2025-06-05 DIAGNOSIS — J44.9 CHRONIC OBSTRUCTIVE PULMONARY DISEASE, UNSPECIFIED COPD TYPE (H): ICD-10-CM

## 2025-06-05 DIAGNOSIS — Z13.29 SCREENING FOR THYROID DISORDER: ICD-10-CM

## 2025-06-05 DIAGNOSIS — I49.1 PAC (PREMATURE ATRIAL CONTRACTION): ICD-10-CM

## 2025-06-05 DIAGNOSIS — J44.9 COPD, MODERATE (H): ICD-10-CM

## 2025-06-05 DIAGNOSIS — Z00.00 PREVENTATIVE HEALTH CARE: ICD-10-CM

## 2025-06-05 DIAGNOSIS — J45.20 MILD INTERMITTENT EXTRINSIC ASTHMA WITHOUT COMPLICATION: ICD-10-CM

## 2025-06-05 DIAGNOSIS — E78.2 MIXED HYPERLIPIDEMIA: ICD-10-CM

## 2025-06-05 DIAGNOSIS — N83.8 OVARIAN MASS: Primary | ICD-10-CM

## 2025-06-05 DIAGNOSIS — E53.8 VITAMIN B12 DEFICIENCY (NON ANEMIC): ICD-10-CM

## 2025-06-05 LAB
ALBUMIN SERPL BCG-MCNC: 4.3 G/DL (ref 3.5–5.2)
ALBUMIN UR-MCNC: NEGATIVE MG/DL
ALP SERPL-CCNC: 83 U/L (ref 40–150)
ALT SERPL W P-5'-P-CCNC: 19 U/L (ref 0–50)
ANION GAP SERPL CALCULATED.3IONS-SCNC: 10 MMOL/L (ref 7–15)
APPEARANCE UR: CLEAR
AST SERPL W P-5'-P-CCNC: 25 U/L (ref 0–45)
BACTERIA #/AREA URNS HPF: ABNORMAL /HPF
BASOPHILS # BLD AUTO: 0.1 10E3/UL (ref 0–0.2)
BASOPHILS NFR BLD AUTO: 0 %
BILIRUB SERPL-MCNC: 0.4 MG/DL
BILIRUB UR QL STRIP: NEGATIVE
BUN SERPL-MCNC: 29.3 MG/DL (ref 8–23)
CALCIUM SERPL-MCNC: 9.3 MG/DL (ref 8.8–10.4)
CHLORIDE SERPL-SCNC: 105 MMOL/L (ref 98–107)
CHOLEST SERPL-MCNC: 162 MG/DL
COLOR UR AUTO: YELLOW
CREAT SERPL-MCNC: 0.88 MG/DL (ref 0.51–0.95)
EGFRCR SERPLBLD CKD-EPI 2021: 66 ML/MIN/1.73M2
EOSINOPHIL # BLD AUTO: 0 10E3/UL (ref 0–0.7)
EOSINOPHIL NFR BLD AUTO: 0 %
ERYTHROCYTE [DISTWIDTH] IN BLOOD BY AUTOMATED COUNT: 13.2 % (ref 10–15)
EST. AVERAGE GLUCOSE BLD GHB EST-MCNC: 111 MG/DL
FASTING STATUS PATIENT QL REPORTED: YES
FASTING STATUS PATIENT QL REPORTED: YES
GLUCOSE SERPL-MCNC: 94 MG/DL (ref 70–99)
GLUCOSE UR STRIP-MCNC: NEGATIVE MG/DL
HBA1C MFR BLD: 5.5 %
HCO3 SERPL-SCNC: 26 MMOL/L (ref 22–29)
HCT VFR BLD AUTO: 41.4 % (ref 35–47)
HDLC SERPL-MCNC: 59 MG/DL
HGB BLD-MCNC: 13.5 G/DL (ref 11.7–15.7)
HGB UR QL STRIP: NEGATIVE
IMM GRANULOCYTES # BLD: 0.1 10E3/UL
IMM GRANULOCYTES NFR BLD: 1 %
KETONES UR STRIP-MCNC: NEGATIVE MG/DL
LDLC SERPL CALC-MCNC: 85 MG/DL
LEUKOCYTE ESTERASE UR QL STRIP: ABNORMAL
LYMPHOCYTES # BLD AUTO: 4.1 10E3/UL (ref 0.8–5.3)
LYMPHOCYTES NFR BLD AUTO: 29 %
MCH RBC QN AUTO: 30.8 PG (ref 26.5–33)
MCHC RBC AUTO-ENTMCNC: 32.6 G/DL (ref 31.5–36.5)
MCV RBC AUTO: 94 FL (ref 78–100)
MONOCYTES # BLD AUTO: 1 10E3/UL (ref 0–1.3)
MONOCYTES NFR BLD AUTO: 7 %
NEUTROPHILS # BLD AUTO: 8.8 10E3/UL (ref 1.6–8.3)
NEUTROPHILS NFR BLD AUTO: 63 %
NITRATE UR QL: NEGATIVE
NONHDLC SERPL-MCNC: 103 MG/DL
NRBC # BLD AUTO: 0 10E3/UL
NRBC BLD AUTO-RTO: 0 /100
PH UR STRIP: 5 [PH] (ref 5–9)
PLATELET # BLD AUTO: 224 10E3/UL (ref 150–450)
POTASSIUM SERPL-SCNC: 4.6 MMOL/L (ref 3.4–5.3)
PROT SERPL-MCNC: 7.1 G/DL (ref 6.4–8.3)
RBC # BLD AUTO: 4.39 10E6/UL (ref 3.8–5.2)
RBC URINE: 1 /HPF
SODIUM SERPL-SCNC: 141 MMOL/L (ref 135–145)
SP GR UR STRIP: 1.03 (ref 1–1.03)
TRIGL SERPL-MCNC: 89 MG/DL
TSH SERPL DL<=0.005 MIU/L-ACNC: 3.93 UIU/ML (ref 0.3–4.2)
UROBILINOGEN UR STRIP-MCNC: NORMAL MG/DL
VIT B12 SERPL-MCNC: 380 PG/ML (ref 232–1245)
WBC # BLD AUTO: 14 10E3/UL (ref 4–11)
WBC URINE: 2 /HPF

## 2025-06-05 PROCEDURE — 82435 ASSAY OF BLOOD CHLORIDE: CPT | Mod: ZL | Performed by: FAMILY MEDICINE

## 2025-06-05 PROCEDURE — G0463 HOSPITAL OUTPT CLINIC VISIT: HCPCS

## 2025-06-05 PROCEDURE — 83036 HEMOGLOBIN GLYCOSYLATED A1C: CPT | Mod: ZL | Performed by: FAMILY MEDICINE

## 2025-06-05 PROCEDURE — 85014 HEMATOCRIT: CPT | Mod: ZL | Performed by: FAMILY MEDICINE

## 2025-06-05 PROCEDURE — 84443 ASSAY THYROID STIM HORMONE: CPT | Mod: ZL | Performed by: FAMILY MEDICINE

## 2025-06-05 PROCEDURE — 81003 URINALYSIS AUTO W/O SCOPE: CPT | Mod: ZL | Performed by: FAMILY MEDICINE

## 2025-06-05 PROCEDURE — 82465 ASSAY BLD/SERUM CHOLESTEROL: CPT | Mod: ZL | Performed by: FAMILY MEDICINE

## 2025-06-05 PROCEDURE — 82607 VITAMIN B-12: CPT | Mod: ZL | Performed by: FAMILY MEDICINE

## 2025-06-05 PROCEDURE — 36415 COLL VENOUS BLD VENIPUNCTURE: CPT | Mod: ZL | Performed by: FAMILY MEDICINE

## 2025-06-05 RX ORDER — ALBUTEROL SULFATE 90 UG/1
2 INHALANT RESPIRATORY (INHALATION) EVERY 4 HOURS PRN
Qty: 18 G | Refills: 11 | Status: SHIPPED | OUTPATIENT
Start: 2025-06-05

## 2025-06-05 RX ORDER — ATORVASTATIN CALCIUM 40 MG/1
40 TABLET, FILM COATED ORAL
Qty: 90 TABLET | Refills: 3 | Status: SHIPPED | OUTPATIENT
Start: 2025-06-05

## 2025-06-05 RX ORDER — DILTIAZEM HYDROCHLORIDE 120 MG/1
120 CAPSULE, EXTENDED RELEASE ORAL DAILY
Qty: 90 CAPSULE | Refills: 3 | Status: SHIPPED | OUTPATIENT
Start: 2025-06-05

## 2025-06-05 RX ORDER — CYCLOBENZAPRINE HCL 5 MG
5 TABLET ORAL 3 TIMES DAILY PRN
Qty: 30 TABLET | Refills: 11 | Status: SHIPPED | OUTPATIENT
Start: 2025-06-05

## 2025-06-05 RX ORDER — ALENDRONATE SODIUM 70 MG/1
70 TABLET ORAL
Qty: 12 TABLET | Refills: 4 | Status: SHIPPED | OUTPATIENT
Start: 2025-06-05

## 2025-06-05 ASSESSMENT — PAIN SCALES - GENERAL: PAINLEVEL_OUTOF10: NO PAIN (0)

## 2025-06-05 NOTE — NURSING NOTE
Chief Complaint   Patient presents with    Results     MRI 6/3         Medication Reconciliation: complete    Adore Reyes, LPN

## 2025-06-05 NOTE — PROGRESS NOTES
Nursing Notes:   Adore Reyes LPN  6/5/2025 10:01 AM  Signed  Chief Complaint   Patient presents with    Results     MRI 6/3         Medication Reconciliation: complete    Adore Reyes LPN           Taya Mann is a 80 year old, presenting for the following health issues:  Results (MRI 6/3)        6/5/2025    10:00 AM   Additional Questions   Roomed by Adore Reyes     EMBER Mann is here today for follow up.  She had had an MRI of her lumbar spine on 4/24/25.  Incidentally noted was a cystic left adnexal mass.  It was recommended that she have an ultrasound or MRI to evaluate further.  She went on to have an ultrasound of her pelvis on 5/15/25.  Her left ovary appeared normal, but it was noted that she had a mass of 3.5x3.6x4.1 cm in the left adnexal region.  MRI of the pelvis was then completed yesterday for further evaluation.  She again had a mass noted measuring 4.6 cm of her left ovary.  It was noted to have an atypical appearance and was not suspicious for hemorrhagic cyst, proteinaceous cyst, high grade neoplasm or fibrous tumor.  Etiology was unclear.  GYN consult was recommended and follow up MRI in 6 months if this is not resected.  Has had no left pelvic pain.  No vaginal bleeding or other symptoms.      She has been using a fit bit.  It has told her she may have had atrial fibrillation and some episodes of pulses in the 40s.  She has also had some oxygen saturations in the 80s overnight.  Has appointment with Cardiology on 6/17/25.  Prior ziopatch in 2022 showed frequent ventricular ectopy with bigeminy and trigeminy.  She also had aberrant  supraventricular tachycardia vs VT at that time.   She does have a prior history of COPD with a remote history of tobacco use, but quit 40 years ago.    She had a steroid injection 2 days ago, which helped her sciatica symptoms tremendously.        Review of Systems  Constitutional, HEENT, cardiovascular, pulmonary, GI, , musculoskeletal, neuro,  skin, endocrine and psych systems are negative, except as otherwise noted.      Objective    /80   Pulse 60   Temp 97.3  F (36.3  C) (Tympanic)   Resp 16   Wt 84.1 kg (185 lb 6.4 oz)   LMP  (LMP Unknown)   SpO2 96%   Breastfeeding No   BMI 31.58 kg/m    Body mass index is 31.58 kg/m .  Physical Exam  Constitutional:       Appearance: Normal appearance.   HENT:      Head: Normocephalic.   Eyes:      Extraocular Movements: Extraocular movements intact.      Pupils: Pupils are equal, round, and reactive to light.   Cardiovascular:      Rate and Rhythm: Normal rate and regular rhythm.      Heart sounds: Normal heart sounds. No murmur heard.  Pulmonary:      Effort: Pulmonary effort is normal.      Breath sounds: Normal breath sounds. No wheezing, rhonchi or rales.   Abdominal:      General: Abdomen is flat. Bowel sounds are normal.      Palpations: Abdomen is soft.      Tenderness: There is no abdominal tenderness. There is no guarding or rebound.      Hernia: No hernia is present.   Musculoskeletal:      Cervical back: Normal range of motion and neck supple.   Lymphadenopathy:      Cervical: No cervical adenopathy.   Neurological:      Mental Status: She is alert.        PROCEDURE: MR PELVIS (INTRAPELVIC ORGANS) W/O & W CONTRAST     INDICATION: Ovarian mass.     COMPARISON: Ultrasound 5/15/2025     TECHNIQUE:  Multiplanar MRI of the pelvis was performed utilizing  T1-weighted and fluid sensitive sequences as well as in and out of  phase, diffusion and T1 fat-saturated postcontrast images.     MEDS/CONTRAST: DOTAREM 17ML     FINDINGS: The left ovary contains a T2 hyperintense, T1 hypointense,  restricting and nonenhancing mass measuring 4.6 x 3.2 cm.      No right adnexal mass.      The visualized bowel is normal for a few distal colonic diverticula  without acute inflammation.     No free fluid. No adenopathy.      No suspicious osseous lesion.                                                                       IMPRESSION:  4.6 cm atypical left ovarian mass. The lack of intrinsic  T1 signal argues against hemorrhage or a proteinaceous cyst. The lack  of suspicious enhancement argues against high-grade neoplasm. The  increased T2 signal argues against a fibrous tumor. Recommend  gynecologic follow-up. Recommend repeat MR in 6 months absent  resection.     RUSTAM BARNEY MD           ICD-10-CM    1. Ovarian mass  N83.8 Ob/Gyn  Referral     UA Macroscopic with reflex to Microscopic and Culture     UA Macroscopic with reflex to Microscopic and Culture      2. Other spondylosis with radiculopathy, lumbar region  M47.26       3. COPD, moderate (H)  J44.9       4. Paroxysmal ventricular tachycardia (H)  I47.20       5. Preventative health care  Z00.00 CBC and Differential     Comprehensive Metabolic Panel     TSH Reflex GH     Vitamin B12     Vitamin D Total     Hemoglobin A1c     Lipid Panel      6. Screening for thyroid disorder  Z13.29 TSH Reflex GH      7. Vitamin B12 deficiency (non anemic)  E53.8 Vitamin B12      8. Vitamin D deficiency  E55.9 Vitamin D Total      9. Mixed hyperlipidemia  E78.2 Lipid Panel           Recommend referral to OB GYN for further evaluation.  Discussed if she ends up not having this removed, it would be recommended to have follow up imaging again in 6 months.  Will defer to OB GYN about plan for this.    Has had some relief from her radicular pain already from injection 2 days ago.  Breathing is stable.  Only uses albuterol as needed and has a prescription for singulair as well.  Has not used any other inhaled steroids recently.  She has had a prior history of VT vs supraventricular tachycardia.  Her fit bit also raised concern for possible atrial fibrillation and hypoxia and bradycardia at times with sleeping.  She is seeing Cardiology in just over a week and will discuss further with them at that time.  Discussed she may want to be evaluated for obstructive sleep apnea, but  will defer that decision to Cardiology.  Labs drawn as above per previous orders placed.  TSH ordered as above.   B12 ordered as above.  Vitamin D level ordered as above.  Lipid profile as above.  She is on Atorvastatin 40 mg daily.    Return for Medicare Wellness Visit.     34 minutes spent in review of chart, interviewing patient, examining patient, discussing plan, reviewing results and completing note on the date of encounter.    The longitudinal plan of care for the diagnosis(es)/condition(s) as documented were addressed during this visit. Due to the added complexity in care, I will continue to support Mackenzie in the subsequent management and with ongoing continuity of care.    Caryn Alvarenga MD

## 2025-06-16 ENCOUNTER — TELEPHONE (OUTPATIENT)
Dept: GENERAL RADIOLOGY | Facility: OTHER | Age: 80
End: 2025-06-16
Payer: COMMERCIAL

## 2025-06-16 NOTE — TELEPHONE ENCOUNTER
2 week follow up after lumbar injection pt states that she is getting about 80% relief.  Irene Diaz, ARRT on 6/16/2025 at 9:56 AM

## 2025-06-17 ENCOUNTER — OFFICE VISIT (OUTPATIENT)
Dept: CARDIOLOGY | Facility: OTHER | Age: 80
End: 2025-06-17
Attending: INTERNAL MEDICINE
Payer: MEDICARE

## 2025-06-17 VITALS
DIASTOLIC BLOOD PRESSURE: 80 MMHG | HEART RATE: 60 BPM | RESPIRATION RATE: 16 BRPM | TEMPERATURE: 97.8 F | WEIGHT: 182 LBS | HEIGHT: 64 IN | SYSTOLIC BLOOD PRESSURE: 150 MMHG | OXYGEN SATURATION: 96 % | BODY MASS INDEX: 31.07 KG/M2

## 2025-06-17 DIAGNOSIS — Z79.01 ON CONTINUOUS ORAL ANTICOAGULATION: ICD-10-CM

## 2025-06-17 DIAGNOSIS — I49.3 FREQUENT PVCS: ICD-10-CM

## 2025-06-17 DIAGNOSIS — R06.09 DOE (DYSPNEA ON EXERTION): ICD-10-CM

## 2025-06-17 DIAGNOSIS — I47.10 SVT (SUPRAVENTRICULAR TACHYCARDIA): ICD-10-CM

## 2025-06-17 DIAGNOSIS — I49.1 PAC (PREMATURE ATRIAL CONTRACTION): ICD-10-CM

## 2025-06-17 DIAGNOSIS — R00.2 HEART PALPITATIONS: ICD-10-CM

## 2025-06-17 DIAGNOSIS — I48.0 PAROXYSMAL ATRIAL FIBRILLATION (H): Primary | ICD-10-CM

## 2025-06-17 DIAGNOSIS — I49.3 PVC'S (PREMATURE VENTRICULAR CONTRACTIONS): ICD-10-CM

## 2025-06-17 PROCEDURE — G0463 HOSPITAL OUTPT CLINIC VISIT: HCPCS

## 2025-06-17 RX ORDER — DILTIAZEM HYDROCHLORIDE 120 MG/1
120 CAPSULE, EXTENDED RELEASE ORAL 2 TIMES DAILY
Qty: 180 CAPSULE | Refills: 3 | Status: SHIPPED | OUTPATIENT
Start: 2025-06-17

## 2025-06-17 ASSESSMENT — PAIN SCALES - GENERAL: PAINLEVEL_OUTOF10: MODERATE PAIN (4)

## 2025-06-17 NOTE — NURSING NOTE
"Chief Complaint   Patient presents with    Follow Up     SVT       Initial BP (!) 160/90 (BP Location: Right arm, Patient Position: Sitting, Cuff Size: Adult Regular)   Pulse 60   Temp 97.8  F (36.6  C) (Temporal)   Resp 16   Ht 1.63 m (5' 4.17\")   Wt 82.6 kg (182 lb)   LMP  (LMP Unknown)   SpO2 96%   BMI 31.07 kg/m   Estimated body mass index is 31.07 kg/m  as calculated from the following:    Height as of this encounter: 1.63 m (5' 4.17\").    Weight as of this encounter: 82.6 kg (182 lb).  Meds Reconciled: complete  Pt is on Aspirin  Pt is on a Statin  PHQ and/or HAL reviewed. Pt referred to PCP/MH Provider as appropriate.    Evelyn Ferreira LPN      "

## 2025-06-17 NOTE — PROGRESS NOTES
[facility] HEART CARE   CARDIOLOGY PROGRESS NOTE     Chief Complaint   Patient presents with    Follow Up     SVT          Diagnosis:  1.  SVT/atrial fibrillation.   - Eliquis and diltiazem.    - Eliquis started on 6/17/2025.   - A-fib x2 on 2/20/2025, noted on Fitbit.    - 1:50 AM to 2:17 AM.    - 3:37 AM to 4:02 AM.   -x283 up to 15.7 seconds, 4/13/2022.  2.  CAD.   -Calcium score of 482.9.   -L Main 2.5; .7 (part of this is actually in the   distal left main and ostial left circumflex); LCX 0, RCA: 16.7.   - Normal stress echo on 6/10/2022.  3.  HERMOSILLO.  4.  COPD.  5.  History of tobacco abuse.   - Quitting in 1990.  6.  B12 deficiency.   - 380 on 6/5/2025.   - 184 on 1/14/2021.      Assessment/Plan:   1.  SVT/A-fib: Reviewed her Fitbit from 2/20/2025.  She had palpitations at night.  Fitbit noted she had A-fib.  First episode was at 1:50 AM and completed at 2:17 AM.  Second episode was at 3:37 AM and completed 4:02 AM.  She does get occasional palpitations.  She wears her Fitbit continuously.  Reviewed her previous Zio patch from 4/13/2022 that showed x283 episodes of SVT up to 15.7 seconds.  I looked at the rhythm strips.  It looks like some of these were A-fib.  Very few rhythm strips were provided.  Based on her Fitbit, Zio patch, and symptoms, I suggested that she start on Eliquis 5 mg twice a twice a day.  Increase diltiazem from 120 mg daily to 120 mg twice a day.  Using the heart model room, we discussed A-fib heavily.  Discussed rate controlling medications, antiarrhythmics, cardioversion, and the possibly ablation.  Will continue diltiazem and Eliquis.  Will follow-up after 30-day monitor.  2.  CAD: Elevated coronary calcium score of 482.9.  Discussed these findings.  With initiation of Eliquis, discontinue aspirin.  Continue Lipitor 40 mg daily.  3.  Follow-up after completion of 30-day monitor.  Started on Eliquis and increase diltiazem from 120 mg once daily to 120 mg twice a day.      Interval  history:  See above.      Relevant testing:  US abdominal aorta on 6/8/2023:  No abdominal aortic aneurysm.    CT scan for calcium on 5/22/2023:   1.  The total calcium score is 482.9 (accuracy of this number may be   reduced, see details above); the observed coronary calcium score at 91st   percentile for subjects of the same age, gender, and race/ethnicity who   are free of clinical cardiovascular disease and treated diabetes.    2.  Mildly dilated ascending aorta (measuring about 3.7 cm given   limitations on noncontrast study).    3.  If clinically indicated, consider aggressive cardiovascular risk   factor modification and ischemic risk stratification.        4.  See separate radiology report for extracardiac findings.    Stress echo on 6/10/2022:  Normal, low-risk exercise echocardiogram.   The target heart rate was achieved, reached 88% of MPHR. Normal heart rate and   borderline BP response to exercise reaching max 222/94mmHg.   Normal biventricular size, thickness, and global systolic function at   baseline, LVEF=60-65%.   With exercise, LVEF increased to >70% and LV cavity size decreased   appropriately.   No regional wall motion abnormalities are present at rest or with exercise.   No angina was elicited.   No ECG evidence of ischemia with some PAC's and PVC's during stress.   No significant valvular abnormalities are noted on screening Doppler exam.   The aortic root and visualized ascending aorta are normal.     Zio patch on 4/13/22:  Indication: 77-year-old female with heart palpitations  Analysis Time: 13 days, 16 hours starting 5/12/2022  Minimum heart rate 39, maximum 210, average 57 bpm.  Predominant underlying rhythm was sinus rhythm.  3 SVT runs with aberrancy vs ventricular tachycardia runs occurred, the run with the fastest interval lasted 7 beats with maximum rate of 179 bpm.  Longest lasted 6 beats with average rate of 169 bpm.  283 supraventricular tachycardia runs occurred.  The run with  the fastest interval lasted 19 beats with maximum rate of 210 bpm.  Longest lasted 15.7 seconds with average rate of 136 bpm.  Idioventricular rhythm was present.  Isolated supraventricular ectopic beats were frequent.  SVE couplets were occasional.  SVE triplets were rare.  Isolated ventricular ectopic beats was frequent.  No ventricular couplets or triplets present.  Ventricular bigeminy and trigeminy were present.            ICD-10-CM    1. Paroxysmal atrial fibrillation (H)  I48.0 Adult Cardiac Mobile Telemetry Monitor     apixaban ANTICOAGULANT (ELIQUIS ANTICOAGULANT) 5 MG tablet     diltiazem ER (DILT-XR) 120 MG 24 hr capsule      2. On continuous oral anticoagulation  Z79.01 apixaban ANTICOAGULANT (ELIQUIS ANTICOAGULANT) 5 MG tablet      3. SVT (supraventricular tachycardia)  I47.10 Adult Cardiology [facility] Referral     EKG 12-lead, tracing only     Adult Cardiac Mobile Telemetry Monitor     apixaban ANTICOAGULANT (ELIQUIS ANTICOAGULANT) 5 MG tablet     diltiazem ER (DILT-XR) 120 MG 24 hr capsule      4. HERMOSILLO (dyspnea on exertion)  R06.09       5. PVC's (premature ventricular contractions)  I49.3 diltiazem ER (DILT-XR) 120 MG 24 hr capsule      6. PAC (premature atrial contraction)  I49.1 diltiazem ER (DILT-XR) 120 MG 24 hr capsule      7. Heart palpitations  R00.2 diltiazem ER (DILT-XR) 120 MG 24 hr capsule      8. Frequent PVCs  I49.3 diltiazem ER (DILT-XR) 120 MG 24 hr capsule          No past medical history on file.    Past Surgical History:   Procedure Laterality Date    APPENDECTOMY      BREAST EXCISIONAL BIOPSY Right     benign    HYSTERECTOMY      ovaries remain    LAPAROSCOPIC CHOLECYSTECTOMY      LUNG BIOPSY      due to chronic cough - benign findings    TONSILLECTOMY         No Known Allergies    Current Outpatient Medications   Medication Sig Dispense Refill    albuterol (PROAIR HFA/PROVENTIL HFA/VENTOLIN HFA) 108 (90 Base) MCG/ACT inhaler Inhale 2 puffs into the lungs every 4 hours as  needed for shortness of breath or wheezing. 18 g 11    alendronate (FOSAMAX) 70 MG tablet Take 1 tablet (70 mg) by mouth every 7 days. 12 tablet 4    apixaban ANTICOAGULANT (ELIQUIS ANTICOAGULANT) 5 MG tablet Take 1 tablet (5 mg) by mouth 2 times daily. 180 tablet 3    atorvastatin (LIPITOR) 40 MG tablet Take 1 tablet (40 mg) by mouth daily at 2 pm. 90 tablet 3    cyanocobalamin (VITAMIN B-12) 1000 MCG tablet Take 1 tablet (1,000 mcg) by mouth daily 90 tablet 3    cyclobenzaprine (FLEXERIL) 5 MG tablet Take 1 tablet (5 mg) by mouth 3 times daily as needed for muscle spasms. 30 tablet 11    diltiazem ER (DILT-XR) 120 MG 24 hr capsule Take 1 capsule (120 mg) by mouth 2 times daily. 180 capsule 3    mometasone (ELOCON) 0.1 % external ointment APPLY TO ITCHY SPOT IN GROIN TWO TIMES A DAY UNTIL CLEAR. RESTART AS NEEDED.      montelukast (SINGULAIR) 10 MG tablet TAKE 1 TABLET (10MG) BY MOUTH AT BEDTIME 90 tablet 3    Spacer/Aero-Holding Chambers (VORTEX VALVED HOLDING CHAMBER) CHICA Use with HFA 1 each 11    ALPRAZolam (XANAX) 0.5 MG tablet Take 1 tablet (0.5 mg) by mouth once as needed for anxiety (to take once prior to MRI). 1 tablet 0    Ca Carbonate-Mag Hydroxide 1000-200 MG CHEW Take by mouth.      Vitamin D (Cholecalciferol) 25 MCG (1000 UT) TABS          Social History     Socioeconomic History    Marital status:      Spouse name: Not on file    Number of children: Not on file    Years of education: Not on file    Highest education level: Not on file   Occupational History    Not on file   Tobacco Use    Smoking status: Former     Current packs/day: 0.00     Average packs/day: 1.5 packs/day for 30.0 years (45.0 ttl pk-yrs)     Types: Cigarettes     Start date: 1960     Quit date: 1990     Years since quittin.4     Passive exposure: Past    Smokeless tobacco: Never   Vaping Use    Vaping status: Never Used   Substance and Sexual Activity    Alcohol use: Not Currently    Drug use: Never    Sexual  activity: Yes     Partners: Male   Other Topics Concern    Not on file   Social History Narrative    .   - Don ([place]).  Worked as a realtor for 35 years.  Her  worked for an auto insurance company.  Moved from [facility] in 2021.     Social Drivers of Health     Financial Resource Strain: Low Risk  (5/20/2024)    Financial Resource Strain     Within the past 12 months, have you or your family members you live with been unable to get utilities (heat, electricity) when it was really needed?: No   Food Insecurity: Low Risk  (5/20/2024)    Food Insecurity     Within the past 12 months, did you worry that your food would run out before you got money to buy more?: No     Within the past 12 months, did the food you bought just not last and you didn t have money to get more?: No   Transportation Needs: Low Risk  (5/20/2024)    Transportation Needs     Within the past 12 months, has lack of transportation kept you from medical appointments, getting your medicines, non-medical meetings or appointments, work, or from getting things that you need?: No   Physical Activity: Not on file   Stress: Not on file   Social Connections: Not on file   Interpersonal Safety: Low Risk  (6/17/2025)    Interpersonal Safety     Do you feel physically and emotionally safe where you currently live?: Yes     Within the past 12 months, have you been hit, slapped, kicked or otherwise physically hurt by someone?: No     Within the past 12 months, have you been humiliated or emotionally abused in other ways by your partner or ex-partner?: No   Housing Stability: Low Risk  (5/20/2024)    Housing Stability     Do you have housing? : Yes     Are you worried about losing your housing?: No       LAB RESULTS:   Office Visit on 06/05/2025   Component Date Value Ref Range Status    Color Urine 06/05/2025 Yellow  Colorless, Straw, Light Yellow, Yellow Final    Appearance Urine 06/05/2025 Clear  Clear Final    Glucose Urine  06/05/2025 Negative  Negative mg/dL Final    Bilirubin Urine 06/05/2025 Negative  Negative Final    Ketones Urine 06/05/2025 Negative  Negative mg/dL Final    Specific Gravity Urine 06/05/2025 1.027  1.000 - 1.030 Final    Blood Urine 06/05/2025 Negative  Negative Final    pH Urine 06/05/2025 5.0  5.0 - 9.0 Final    Protein Albumin Urine 06/05/2025 Negative  Negative mg/dL Final    Urobilinogen Urine 06/05/2025 Normal  Normal mg/dL Final    Nitrite Urine 06/05/2025 Negative  Negative Final    Leukocyte Esterase Urine 06/05/2025 Small (A)  Negative Final    Bacteria Urine 06/05/2025 Few (A)  None Seen /HPF Final    RBC Urine 06/05/2025 1  <=2 /HPF Final    WBC Urine 06/05/2025 2  <=5 /HPF Final    Sodium 06/05/2025 141  135 - 145 mmol/L Final    Potassium 06/05/2025 4.6  3.4 - 5.3 mmol/L Final    Carbon Dioxide (CO2) 06/05/2025 26  22 - 29 mmol/L Final    Anion Gap 06/05/2025 10  7 - 15 mmol/L Final    Urea Nitrogen 06/05/2025 29.3 (H)  8.0 - 23.0 mg/dL Final    Creatinine 06/05/2025 0.88  0.51 - 0.95 mg/dL Final    GFR Estimate 06/05/2025 66  >60 mL/min/1.73m2 Final    Calcium 06/05/2025 9.3  8.8 - 10.4 mg/dL Final    Chloride 06/05/2025 105  98 - 107 mmol/L Final    Glucose 06/05/2025 94  70 - 99 mg/dL Final    Alkaline Phosphatase 06/05/2025 83  40 - 150 U/L Final    AST 06/05/2025 25  0 - 45 U/L Final    ALT 06/05/2025 19  0 - 50 U/L Final    Protein Total 06/05/2025 7.1  6.4 - 8.3 g/dL Final    Albumin 06/05/2025 4.3  3.5 - 5.2 g/dL Final    Bilirubin Total 06/05/2025 0.4  <=1.2 mg/dL Final    Patient Fasting > 8hrs? 06/05/2025 Yes   Final    TSH 06/05/2025 3.93  0.30 - 4.20 uIU/mL Final    Vitamin B12 06/05/2025 380  232 - 1,245 pg/mL Final    Vitamin D, Total (25-Hydroxy) 06/05/2025 47  20 - 50 ng/mL Final    Estimated Average Glucose 06/05/2025 111  <117 mg/dL Final    Hemoglobin A1C 06/05/2025 5.5  <5.7 % Final    Cholesterol 06/05/2025 162  <200 mg/dL Final    Triglycerides 06/05/2025 89  <150 mg/dL Final     Direct Measure HDL 06/05/2025 59  >=50 mg/dL Final    LDL Cholesterol Calculated 06/05/2025 85  <100 mg/dL Final    Non HDL Cholesterol 06/05/2025 103  <130 mg/dL Final    Patient Fasting > 8hrs? 06/05/2025 Yes   Final    WBC Count 06/05/2025 14.0 (H)  4.0 - 11.0 10e3/uL Final    RBC Count 06/05/2025 4.39  3.80 - 5.20 10e6/uL Final    Hemoglobin 06/05/2025 13.5  11.7 - 15.7 g/dL Final    Hematocrit 06/05/2025 41.4  35.0 - 47.0 % Final    MCV 06/05/2025 94  78 - 100 fL Final    MCH 06/05/2025 30.8  26.5 - 33.0 pg Final    MCHC 06/05/2025 32.6  31.5 - 36.5 g/dL Final    RDW 06/05/2025 13.2  10.0 - 15.0 % Final    Platelet Count 06/05/2025 224  150 - 450 10e3/uL Final    % Neutrophils 06/05/2025 63  % Final    % Lymphocytes 06/05/2025 29  % Final    % Monocytes 06/05/2025 7  % Final    % Eosinophils 06/05/2025 0  % Final    % Basophils 06/05/2025 0  % Final    % Immature Granulocytes 06/05/2025 1  % Final    NRBCs per 100 WBC 06/05/2025 0  <1 /100 Final    Absolute Neutrophils 06/05/2025 8.8 (H)  1.6 - 8.3 10e3/uL Final    Absolute Lymphocytes 06/05/2025 4.1  0.8 - 5.3 10e3/uL Final    Absolute Monocytes 06/05/2025 1.0  0.0 - 1.3 10e3/uL Final    Absolute Eosinophils 06/05/2025 0.0  0.0 - 0.7 10e3/uL Final    Absolute Basophils 06/05/2025 0.1  0.0 - 0.2 10e3/uL Final    Absolute Immature Granulocytes 06/05/2025 0.1  <=0.4 10e3/uL Final    Absolute NRBCs 06/05/2025 0.0  10e3/uL Final   Office Visit on 05/12/2025   Component Date Value Ref Range Status    Ventricular Rate 05/12/2025 59  BPM Final    Atrial Rate 05/12/2025 56  BPM Final    NJ Interval 05/12/2025 120  ms Final    QRS Duration 05/12/2025 88  ms Final    QT 05/12/2025 444  ms Final    QTc 05/12/2025 439  ms Final    P Axis 05/12/2025 56  degrees Final    R AXIS 05/12/2025 -50  degrees Final    T Axis 05/12/2025 -22  degrees Final    Interpretation ECG 05/12/2025    Final                    Value:Sinus bradycardia with Premature atrial complexes  Left axis  "deviation  Nonspecific ST abnormality  Abnormal ECG  When compared with ECG of 26-Jul-2022 13:50,  Premature atrial complexes are now Present  QRS axis Shifted left  ST now depressed in Inferior leads  ST no longer depressed in Lateral leads  Nonspecific T wave abnormality no longer evident in Anterior leads  Confirmed by MD MADELYN, MARCELINA ([id number]) on 5/12/2025 5:45:25 PM          Review of systems: Negative except that which was noted in the HPI.    Physical examination:  BP (!) 150/80 (BP Location: Right arm, Patient Position: Sitting, Cuff Size: Adult Regular)   Pulse 60   Temp 97.8  F (36.6  C) (Temporal)   Resp 16   Ht 1.63 m (5' 4.17\")   Wt 82.6 kg (182 lb)   LMP  (LMP Unknown)   SpO2 96%   BMI 31.07 kg/m      GENERAL APPEARANCE: healthy, alert and no distress  CHEST: lungs clear to auscultation - no rales, rhonchi or wheezes, no use of accessory muscles, no retractions, respirations are unlabored, normal respiratory rate  CARDIOVASCULAR: regular rhythm, normal S1 with physiologic split S2, no S3 or S4 and no murmur, click or rub  EXTREMITIES: no clubbing, cyanosis or edema.    Total time spent on day of visit, including review of tests, obtaining/reviewing separately obtained history, ordering medications/tests/procedures, communicating with PCP/consultants, and documenting in electronic medical record: 60 minutes.              Thank you for allowing me to participate in the care of your patient. Please do not hesitate to contact me if you have any questions.     Grupo Hunter, DO          "

## 2025-06-19 LAB
ATRIAL RATE - MUSE: 56 BPM
DIASTOLIC BLOOD PRESSURE - MUSE: NORMAL MMHG
INTERPRETATION ECG - MUSE: NORMAL
P AXIS - MUSE: NORMAL DEGREES
PR INTERVAL - MUSE: 112 MS
QRS DURATION - MUSE: 94 MS
QT - MUSE: 448 MS
QTC - MUSE: 432 MS
R AXIS - MUSE: -54 DEGREES
SYSTOLIC BLOOD PRESSURE - MUSE: NORMAL MMHG
T AXIS - MUSE: -33 DEGREES
VENTRICULAR RATE- MUSE: 56 BPM

## 2025-06-23 ENCOUNTER — HOSPITAL ENCOUNTER (OUTPATIENT)
Dept: CARDIOLOGY | Facility: OTHER | Age: 80
Discharge: HOME | End: 2025-06-23
Attending: INTERNAL MEDICINE
Payer: MEDICARE

## 2025-06-23 DIAGNOSIS — I48.0 PAROXYSMAL ATRIAL FIBRILLATION (H): ICD-10-CM

## 2025-06-23 DIAGNOSIS — I47.10 SVT (SUPRAVENTRICULAR TACHYCARDIA): ICD-10-CM

## 2025-06-23 PROCEDURE — 999N000096 CARDIAC MOBILE TELEMETRY MONITOR

## 2025-06-23 PROCEDURE — 999N000157 HC STATISTIC RCP TIME EA 10 MIN

## 2025-06-23 NOTE — PATIENT INSTRUCTIONS
Date Placed on Pt:  06/23/2025    Respiratory Therapist reviewed MCOT placement process and asked patient if they are comfortable proceeding with placement.    Patient (is or is not) is comfortable proceeding.     Patient (would or would not) would not like an employee of same gender to be (present or to place) present or to place the MCOT.     Patient was given MCOT device and instructed on:    -Keeping monitor and sensor within 10 feet of each other at all times  -How to record a sympton manually  -To remove patch at 5-7 days to charge sensor and reapply patch.  -How to remove sensor from original patch and to place in new patch.  -How to charge sensor and monitor  -How to move through the screens on the monitor  -When to trigger a rod event  -How to return devices (FedEx/UPS)  -How long to wear device  -Who to call with questions  -Not to swim or take a bath with device on  -Patient instructed to wear for 30 days    Documentation of MCOT placement site (Bleeding or Not Bleeding) Not Bleeding.     If there is bleeding documentation of why.  N/A     Documentation of accommodations made for patient.  No     Adonay Welch, RT

## 2025-06-24 ENCOUNTER — TELEPHONE (OUTPATIENT)
Dept: CARDIOLOGY | Facility: OTHER | Age: 80
End: 2025-06-24
Payer: COMMERCIAL

## 2025-06-24 NOTE — TELEPHONE ENCOUNTER
The patient requests a call back regarding when to charge the heart monitor.    Okay to leave detailed message.      Vivian Steward on 6/24/2025 at 11:25 AM

## 2025-06-25 NOTE — TELEPHONE ENCOUNTER
Spoke to Lola in Respiratory.  She states patient has 2 monitors and is supposed to swap them out once daily so one is being worn and the other is charging.  The phone will also tell patient if the battery is running low.  Patient notified of this and verbalized understanding.  No further questions at this time.  Lillie Tai RN......June 25, 2025...11:52 AM

## 2025-06-30 ENCOUNTER — OFFICE VISIT (OUTPATIENT)
Dept: FAMILY MEDICINE | Facility: OTHER | Age: 80
End: 2025-06-30
Attending: FAMILY MEDICINE
Payer: COMMERCIAL

## 2025-06-30 VITALS
TEMPERATURE: 98.6 F | HEIGHT: 65 IN | BODY MASS INDEX: 30.82 KG/M2 | HEART RATE: 60 BPM | OXYGEN SATURATION: 95 % | RESPIRATION RATE: 20 BRPM | WEIGHT: 185 LBS | SYSTOLIC BLOOD PRESSURE: 135 MMHG | DIASTOLIC BLOOD PRESSURE: 78 MMHG

## 2025-06-30 DIAGNOSIS — Z01.818 PREOPERATIVE EXAMINATION: Primary | ICD-10-CM

## 2025-06-30 DIAGNOSIS — J45.20 MILD INTERMITTENT EXTRINSIC ASTHMA WITHOUT COMPLICATION: ICD-10-CM

## 2025-06-30 DIAGNOSIS — I48.0 PAROXYSMAL ATRIAL FIBRILLATION (H): ICD-10-CM

## 2025-06-30 DIAGNOSIS — J44.9 CHRONIC OBSTRUCTIVE PULMONARY DISEASE, UNSPECIFIED COPD TYPE (H): ICD-10-CM

## 2025-06-30 DIAGNOSIS — E53.8 VITAMIN B12 DEFICIENCY (NON ANEMIC): ICD-10-CM

## 2025-06-30 DIAGNOSIS — E55.9 VITAMIN D DEFICIENCY: ICD-10-CM

## 2025-06-30 PROBLEM — J45.909 UNCOMPLICATED ASTHMA, UNSPECIFIED ASTHMA SEVERITY, UNSPECIFIED WHETHER PERSISTENT: Status: RESOLVED | Noted: 2022-07-26 | Resolved: 2025-06-30

## 2025-06-30 PROCEDURE — 99214 OFFICE O/P EST MOD 30 MIN: CPT | Performed by: FAMILY MEDICINE

## 2025-06-30 PROCEDURE — 3078F DIAST BP <80 MM HG: CPT | Performed by: FAMILY MEDICINE

## 2025-06-30 PROCEDURE — 1125F AMNT PAIN NOTED PAIN PRSNT: CPT | Performed by: FAMILY MEDICINE

## 2025-06-30 PROCEDURE — G0463 HOSPITAL OUTPT CLINIC VISIT: HCPCS

## 2025-06-30 PROCEDURE — 3075F SYST BP GE 130 - 139MM HG: CPT | Performed by: FAMILY MEDICINE

## 2025-06-30 ASSESSMENT — PAIN SCALES - GENERAL: PAINLEVEL_OUTOF10: MODERATE PAIN (5)

## 2025-06-30 NOTE — NURSING NOTE
"Chief Complaint   Patient presents with    Pre-Op Exam     Cataracts surgery on July 10 at [facility]       Initial /78 (BP Location: Right arm, Patient Position: Sitting, Cuff Size: Adult Large)   Pulse 60   Temp 98.6  F (37  C) (Temporal)   Resp 20   Ht 1.651 m (5' 5\")   Wt 83.9 kg (185 lb)   LMP  (LMP Unknown)   SpO2 95%   Breastfeeding No   BMI 30.79 kg/m   Estimated body mass index is 30.79 kg/m  as calculated from the following:    Height as of this encounter: 1.651 m (5' 5\").    Weight as of this encounter: 83.9 kg (185 lb).  Medication Reconciliation: complete        Rosa Lima, ANGELA   "

## 2025-06-30 NOTE — PATIENT INSTRUCTIONS
How to Take Your Medication Before Surgery  Preoperative Medication Instructions   Antiplatelet or Anticoagulation Medication Instructions  OK to take the Eliquis without interruption.    Additional Medication Instructions   - Calcium Channel Blockers (amlodipine, diltiazem, felodipine): May be continued on the day of surgery.     Nothing to eat or drink after midnight the night before surgery.

## 2025-06-30 NOTE — PROGRESS NOTES
Preoperative Evaluation  [facility]  [address]  [place] [address]  Phone: [phone]  Fax: [phone]  Primary Provider: Caryn Alvarenga MD  Pre-op Performing Provider: Arsenio Wright MD  Jun 30, 2025 6/26/2025   Surgical Information   What procedure is being done? Pre op   Facility or Hospital where procedure/surgery will be performed: [facility] in [place]   Who is doing the procedure / surgery? Sevilla   Date of surgery / procedure: 7/10/2025   Time of surgery / procedure: ??   Where do you plan to recover after surgery? at home with family     Fax number for surgical facility: [phone]    Assessment & Plan     The proposed surgical procedure is considered LOW risk.    Preoperative examination  Patient is approved as a satisfactory candidate for procedure as planned with anesthesia up to and including general anesthesia.  She will be NPO after midnight the night before surgery and will follow-up per the surgeon's post-operative recommendations.  Marika-operative medication instructions as below.  She will otherwise follow-up with primary care for routine checks or as needed for any other problems.      Paroxysmal atrial fibrillation (H)  Doing well on medication management along with anticoagulation with Eliquis.  No need to interrupt the anticoagulation per [facility] preop paperwork for cataract surgery.    Chronic obstructive pulmonary disease, unspecified COPD type (H)  No acute issues on albuterol as needed.    Vitamin D deficiency  No acute issues on vitamin D.    Vitamin B12 deficiency (non anemic)  No acute issues.            - No identified additional risk factors other than previously addressed    Preoperative Medication Instructions  Antiplatelet or Anticoagulation Medication Instructions  OK to take the Eliquis without interruption.    Additional Medication Instructions   - Calcium Channel Blockers (amlodipine,  diltiazem, felodipine): May be continued on the day of surgery.    Recommendation  Approval given to proceed with proposed procedure, without further diagnostic evaluation.        Taya Mann is a 80 year old, presenting for the following:  Pre-Op Exam (Cataracts surgery on July 10 at [facility])          6/30/2025     1:09 PM   Additional Questions   Roomed by Rosa PA: 80 year old female who I was asked to see in consultation for pre-operative assessment for proposed procedure as above by Dr. Sevilla at [facility].  She will be doing the left cataract on 7/10/2025 and then the right side on 7/24/2025.  She has had gradual decrease in visual acuity over time and now planning for surgical intervention.  She has a more recent diagnosis of atrial fibrillation.  She just saw her cardiologist within the last 2 weeks.  She has been on diltiazem and now was added on Eliquis.  She has been doing fine with that.  No side effects of the medications.  She is fully asymptomatic with this currently.  No palpitations, no dyspnea, no chest pain.  Her other medications have been unchanged.  She does have underlying COPD/asthma, but no recent flareups.  She just takes albuterol on an as-needed basis.  She takes vitamin D and vitamin B12 for those deficiencies.  She is overall otherwise doing well at this time and has no other current complaints or concerns.  No recent illnesses or other symptoms as below.  No personal bleeding problems or problems with anesthesia in the past.            6/26/2025   Pre-Op Questionnaire   Have you ever had a heart attack or stroke? No   Have you ever had surgery on your heart or blood vessels, such as a stent placement, a coronary artery bypass, or surgery on an artery in your head, neck, heart, or legs? No   Do you have chest pain with activity? No   Do you have a history of heart failure? No   Do you currently have a cold, bronchitis or symptoms of other  infection? No   Do you have a cough, shortness of breath, or wheezing? No   Do you or anyone in your family have previous history of blood clots? No   Do you or does anyone in your family have a serious bleeding problem such as prolonged bleeding following surgeries or cuts? No   Have you ever had problems with anemia or been told to take iron pills? No   Have you had any abnormal blood loss such as black, tarry or bloody stools, or abnormal vaginal bleeding? No   Have you ever had a blood transfusion? No   Are you willing to have a blood transfusion if it is medically needed before, during, or after your surgery? Yes   Have you or any of your relatives ever had problems with anesthesia? No   Do you have sleep apnea, excessive snoring or daytime drowsiness? No   Do you have any artifical heart valves or other implanted medical devices like a pacemaker, defibrillator, or continuous glucose monitor? No   Do you have artificial joints? No   Are you allergic to latex? No     Advance Care Planning        Preoperative Review of    reviewed - no record of controlled substances prescribed.          Patient Active Problem List    Diagnosis Date Noted    Paroxysmal atrial fibrillation (H) 06/17/2025     Priority: Medium    On continuous oral anticoagulation 06/17/2025     Priority: Medium    Osteopenia, unspecified location 06/05/2025     Priority: Medium    HERMOSILLO (dyspnea on exertion) 07/26/2022     Priority: Medium    Chronic obstructive pulmonary disease, unspecified COPD type (H) 07/26/2022     Priority: Medium    SVT (supraventricular tachycardia) 07/26/2022     Priority: Medium    Vitamin D deficiency 07/26/2022     Priority: Medium    History of tobacco abuse 07/26/2022     Priority: Medium    Frequent PVCs 07/26/2022     Priority: Medium    Heart palpitations 07/26/2022     Priority: Medium    Personal history of tobacco use, presenting hazards to health 05/19/2021     Priority: Medium    Mild intermittent  extrinsic asthma without complication 05/19/2021     Priority: Medium    Perennial allergic rhinitis 05/19/2021     Priority: Medium    Vitamin B12 deficiency (non anemic) 01/15/2021     Priority: Medium    PVC's (premature ventricular contractions) 01/14/2021     Priority: Medium    PAC (premature atrial contraction) 01/14/2021     Priority: Medium    Dizziness 01/14/2021     Priority: Medium      History reviewed. No pertinent past medical history.  Past Surgical History:   Procedure Laterality Date    APPENDECTOMY      BREAST EXCISIONAL BIOPSY Right     benign    HYSTERECTOMY      ovaries remain    LAPAROSCOPIC CHOLECYSTECTOMY      LUNG BIOPSY      due to chronic cough - benign findings    TONSILLECTOMY       Current Outpatient Medications   Medication Sig Dispense Refill    albuterol (PROAIR HFA/PROVENTIL HFA/VENTOLIN HFA) 108 (90 Base) MCG/ACT inhaler Inhale 2 puffs into the lungs every 4 hours as needed for shortness of breath or wheezing. 18 g 11    alendronate (FOSAMAX) 70 MG tablet Take 1 tablet (70 mg) by mouth every 7 days. 12 tablet 4    apixaban ANTICOAGULANT (ELIQUIS ANTICOAGULANT) 5 MG tablet Take 1 tablet (5 mg) by mouth 2 times daily. 180 tablet 3    atorvastatin (LIPITOR) 40 MG tablet Take 1 tablet (40 mg) by mouth daily at 2 pm. 90 tablet 3    cyanocobalamin (VITAMIN B-12) 1000 MCG tablet Take 1 tablet (1,000 mcg) by mouth daily 90 tablet 3    cyclobenzaprine (FLEXERIL) 5 MG tablet Take 1 tablet (5 mg) by mouth 3 times daily as needed for muscle spasms. 30 tablet 11    diltiazem ER (DILT-XR) 120 MG 24 hr capsule Take 1 capsule (120 mg) by mouth 2 times daily. 180 capsule 3    mometasone (ELOCON) 0.1 % external ointment APPLY TO ITCHY SPOT IN GROIN TWO TIMES A DAY UNTIL CLEAR. RESTART AS NEEDED.      Spacer/Aero-Holding Chambers (VORTEX VALVED HOLDING CHAMBER) CHICA Use with HFA 1 each 11    Vitamin D (Cholecalciferol) 25 MCG (1000 UT) TABS       Ca Carbonate-Mag Hydroxide 1000-200 MG CHEW Take by  "mouth.         No Known Allergies     Social History     Tobacco Use    Smoking status: Former     Current packs/day: 0.00     Average packs/day: 1.5 packs/day for 30.0 years (45.0 ttl pk-yrs)     Types: Cigarettes     Start date: 1960     Quit date: 1990     Years since quittin.5     Passive exposure: Past    Smokeless tobacco: Never   Substance Use Topics    Alcohol use: Not Currently     Family History   Problem Relation Age of Onset    Ulcers Mother     Other - See Comments Mother          of old age    Heart Disease Father          at 74, probably  of massive MI    Breast Cancer Sister 42         at 48 of her lung cancer    Brain Cancer Son         glioblastoma IV    Family History Negative Daughter     Family History Negative Daughter     Colon Cancer No family hx of      History   Drug Use Unknown               Objective    /78 (BP Location: Right arm, Patient Position: Sitting, Cuff Size: Adult Large)   Pulse 60   Temp 98.6  F (37  C) (Temporal)   Resp 20   Ht 1.651 m (5' 5\")   Wt 83.9 kg (185 lb)   LMP  (LMP Unknown)   SpO2 95%   Breastfeeding No   BMI 30.79 kg/m     Estimated body mass index is 30.79 kg/m  as calculated from the following:    Height as of this encounter: 1.651 m (5' 5\").    Weight as of this encounter: 83.9 kg (185 lb).  Physical Exam  GENERAL: alert and no distress  EYES: Eyes grossly normal to inspection, PERRL and conjunctivae and sclerae normal  HENT: ear canals and TM's normal, nose and mouth without ulcers or lesions  NECK: no adenopathy, no asymmetry, masses, or scars  RESP: lungs clear to auscultation - no rales, rhonchi or wheezes  CV: regular rate and rhythm, normal S1 S2, no S3 or S4, no murmur, click or rub, no peripheral edema  ABDOMEN: soft, nontender, no hepatosplenomegaly, no masses and bowel sounds normal  MS: no gross musculoskeletal defects noted, no edema  SKIN: no suspicious lesions or rashes  NEURO: Normal strength and tone, " mentation intact and speech normal  PSYCH: mentation appears normal, affect normal/bright    Recent Labs   Lab Test 06/05/25  1048   HGB 13.5         POTASSIUM 4.6   CR 0.88   A1C 5.5        Diagnostics  Recent Results (from the past 720 hours)   UA Macroscopic with reflex to Microscopic and Culture    Collection Time: 06/05/25 10:37 AM    Specimen: Urine, Midstream   Result Value Ref Range    Color Urine Yellow Colorless, Straw, Light Yellow, Yellow    Appearance Urine Clear Clear    Glucose Urine Negative Negative mg/dL    Bilirubin Urine Negative Negative    Ketones Urine Negative Negative mg/dL    Specific Gravity Urine 1.027 1.000 - 1.030    Blood Urine Negative Negative    pH Urine 5.0 5.0 - 9.0    Protein Albumin Urine Negative Negative mg/dL    Urobilinogen Urine Normal Normal mg/dL    Nitrite Urine Negative Negative    Leukocyte Esterase Urine Small (A) Negative    Bacteria Urine Few (A) None Seen /HPF    RBC Urine 1 <=2 /HPF    WBC Urine 2 <=5 /HPF   Comprehensive Metabolic Panel    Collection Time: 06/05/25 10:48 AM   Result Value Ref Range    Sodium 141 135 - 145 mmol/L    Potassium 4.6 3.4 - 5.3 mmol/L    Carbon Dioxide (CO2) 26 22 - 29 mmol/L    Anion Gap 10 7 - 15 mmol/L    Urea Nitrogen 29.3 (H) 8.0 - 23.0 mg/dL    Creatinine 0.88 0.51 - 0.95 mg/dL    GFR Estimate 66 >60 mL/min/1.73m2    Calcium 9.3 8.8 - 10.4 mg/dL    Chloride 105 98 - 107 mmol/L    Glucose 94 70 - 99 mg/dL    Alkaline Phosphatase 83 40 - 150 U/L    AST 25 0 - 45 U/L    ALT 19 0 - 50 U/L    Protein Total 7.1 6.4 - 8.3 g/dL    Albumin 4.3 3.5 - 5.2 g/dL    Bilirubin Total 0.4 <=1.2 mg/dL    Patient Fasting > 8hrs? Yes    TSH Reflex GH    Collection Time: 06/05/25 10:48 AM   Result Value Ref Range    TSH 3.93 0.30 - 4.20 uIU/mL   Vitamin B12    Collection Time: 06/05/25 10:48 AM   Result Value Ref Range    Vitamin B12 380 232 - 1,245 pg/mL   Vitamin D Total    Collection Time: 06/05/25 10:48 AM   Result Value Ref Range     Vitamin D, Total (25-Hydroxy) 47 20 - 50 ng/mL   Hemoglobin A1c    Collection Time: 06/05/25 10:48 AM   Result Value Ref Range    Estimated Average Glucose 111 <117 mg/dL    Hemoglobin A1C 5.5 <5.7 %   Lipid Panel    Collection Time: 06/05/25 10:48 AM   Result Value Ref Range    Cholesterol 162 <200 mg/dL    Triglycerides 89 <150 mg/dL    Direct Measure HDL 59 >=50 mg/dL    LDL Cholesterol Calculated 85 <100 mg/dL    Non HDL Cholesterol 103 <130 mg/dL    Patient Fasting > 8hrs? Yes    CBC with platelets and differential    Collection Time: 06/05/25 10:48 AM   Result Value Ref Range    WBC Count 14.0 (H) 4.0 - 11.0 10e3/uL    RBC Count 4.39 3.80 - 5.20 10e6/uL    Hemoglobin 13.5 11.7 - 15.7 g/dL    Hematocrit 41.4 35.0 - 47.0 %    MCV 94 78 - 100 fL    MCH 30.8 26.5 - 33.0 pg    MCHC 32.6 31.5 - 36.5 g/dL    RDW 13.2 10.0 - 15.0 %    Platelet Count 224 150 - 450 10e3/uL    % Neutrophils 63 %    % Lymphocytes 29 %    % Monocytes 7 %    % Eosinophils 0 %    % Basophils 0 %    % Immature Granulocytes 1 %    NRBCs per 100 WBC 0 <1 /100    Absolute Neutrophils 8.8 (H) 1.6 - 8.3 10e3/uL    Absolute Lymphocytes 4.1 0.8 - 5.3 10e3/uL    Absolute Monocytes 1.0 0.0 - 1.3 10e3/uL    Absolute Eosinophils 0.0 0.0 - 0.7 10e3/uL    Absolute Basophils 0.1 0.0 - 0.2 10e3/uL    Absolute Immature Granulocytes 0.1 <=0.4 10e3/uL    Absolute NRBCs 0.0 10e3/uL   EKG 12-lead, tracing only    Collection Time: 06/17/25 12:57 PM   Result Value Ref Range    Systolic Blood Pressure  mmHg    Diastolic Blood Pressure  mmHg    Ventricular Rate 56 BPM    Atrial Rate 56 BPM    WY Interval 112 ms    QRS Duration 94 ms     ms    QTc 432 ms    P Axis  degrees    R AXIS -54 degrees    T Axis -33 degrees    Interpretation ECG       Sinus bradycardia  Left axis deviation  Left anterior fascicular block  Nonspecific ST and T wave abnormality  When compared with ECG of 12-May-2025 15:52,  Premature atrial complexes are no longer Present  Confirmed by  MD CECIL, FADI ([id number]) on 6/19/2025 10:40:45 AM        No EKG this visit, completed in the last 90 days.  This was just completed on 6/17/25 with no acute changes.    Revised Cardiac Risk Index (RCRI)  The patient has the following serious cardiovascular risks for perioperative complications:   - No serious cardiac risks = 0 points     RCRI Interpretation: 0 points: Class I (very low risk - 0.4% complication rate)         Signed Electronically by: Arsenio Wright MD  A copy of this evaluation report is provided to the requesting physician.

## 2025-07-01 ENCOUNTER — OFFICE VISIT (OUTPATIENT)
Dept: OBGYN | Facility: OTHER | Age: 80
End: 2025-07-01
Attending: FAMILY MEDICINE
Payer: MEDICARE

## 2025-07-01 VITALS
WEIGHT: 184 LBS | DIASTOLIC BLOOD PRESSURE: 78 MMHG | BODY MASS INDEX: 30.62 KG/M2 | HEART RATE: 62 BPM | SYSTOLIC BLOOD PRESSURE: 140 MMHG

## 2025-07-01 DIAGNOSIS — N83.8 OVARIAN MASS: Primary | ICD-10-CM

## 2025-07-01 DIAGNOSIS — D39.12 NEOPLASM OF UNCERTAIN BEHAVIOR OF LEFT OVARY: ICD-10-CM

## 2025-07-01 PROCEDURE — 86304 IMMUNOASSAY TUMOR CA 125: CPT | Mod: ZL | Performed by: OBSTETRICS & GYNECOLOGY

## 2025-07-01 PROCEDURE — 36415 COLL VENOUS BLD VENIPUNCTURE: CPT | Mod: ZL | Performed by: OBSTETRICS & GYNECOLOGY

## 2025-07-01 PROCEDURE — G0463 HOSPITAL OUTPT CLINIC VISIT: HCPCS

## 2025-07-01 ASSESSMENT — PAIN SCALES - GENERAL: PAINLEVEL_OUTOF10: NO PAIN (0)

## 2025-07-01 NOTE — NURSING NOTE
"Chief Complaint   Patient presents with    Consult     Ovarian mass     Patient presents to the clinic for a consult on her ovarian mass. She denies any concerns at this time.     Initial BP (!) 142/80 (BP Location: Right arm, Patient Position: Sitting, Cuff Size: Adult Regular)   Pulse 62   Wt 83.5 kg (184 lb)   LMP  (LMP Unknown)   BMI 30.62 kg/m   Estimated body mass index is 30.62 kg/m  as calculated from the following:    Height as of 6/30/25: 1.651 m (5' 5\").    Weight as of this encounter: 83.5 kg (184 lb).  Medication Review: complete    The next two questions are to help us understand your food security.  If you are feeling you need any assistance in this area, we have resources available to support you today.          5/20/2024   SDOH- Food Insecurity   Within the past 12 months, did you worry that your food would run out before you got money to buy more? N   Within the past 12 months, did the food you bought just not last and you didn t have money to get more? N        Data saved with a previous flowsheet row definition         Health Care Directive:  Patient does not have a Health Care Directive: Discussed advance care planning with patient; however, patient declined at this time.    Leela Ivy LPN      "

## 2025-07-01 NOTE — PROGRESS NOTES
Gynecology Visit    CC: ovarian mass    HPI:    Mackenzie VALLECILLO Workman is a 80 year old, here for the above concern. She had an MRI done on her spine due to sciatica and was incidentally found to have a left ovarian mass. She denies any pelvic pain, bloating, bowel or bladder concerns, vaginal bleeding or discharge. She had a hysterectomy 40+ years ago due to AUB, doesn't remember if she had fibroids.       PMHx:   Patient Active Problem List   Diagnosis    PVC's (premature ventricular contractions)    PAC (premature atrial contraction)    Dizziness    Vitamin B12 deficiency (non anemic)    Personal history of tobacco use, presenting hazards to health    Mild intermittent extrinsic asthma without complication    Perennial allergic rhinitis    HERMOSILLO (dyspnea on exertion)    Chronic obstructive pulmonary disease, unspecified COPD type (H)    SVT (supraventricular tachycardia)    Vitamin D deficiency    History of tobacco abuse    Frequent PVCs    Heart palpitations    Osteopenia, unspecified location    Paroxysmal atrial fibrillation (H)    On continuous oral anticoagulation      PSHx:   Past Surgical History:   Procedure Laterality Date    APPENDECTOMY      BREAST EXCISIONAL BIOPSY Right     benign    HYSTERECTOMY      ovaries remain    LAPAROSCOPIC CHOLECYSTECTOMY      LUNG BIOPSY      due to chronic cough - benign findings    TONSILLECTOMY       Meds:   Current Outpatient Medications   Medication Sig Dispense Refill    albuterol (PROAIR HFA/PROVENTIL HFA/VENTOLIN HFA) 108 (90 Base) MCG/ACT inhaler Inhale 2 puffs into the lungs every 4 hours as needed for shortness of breath or wheezing. 18 g 11    alendronate (FOSAMAX) 70 MG tablet Take 1 tablet (70 mg) by mouth every 7 days. 12 tablet 4    apixaban ANTICOAGULANT (ELIQUIS ANTICOAGULANT) 5 MG tablet Take 1 tablet (5 mg) by mouth 2 times daily. 180 tablet 3    atorvastatin (LIPITOR) 40 MG tablet Take 1 tablet (40 mg) by mouth daily at 2 pm. 90 tablet 3    Ca Carbonate-Mag  Hydroxide 1000-200 MG CHEW Take by mouth.      cyanocobalamin (VITAMIN B-12) 1000 MCG tablet Take 1 tablet (1,000 mcg) by mouth daily 90 tablet 3    cyclobenzaprine (FLEXERIL) 5 MG tablet Take 1 tablet (5 mg) by mouth 3 times daily as needed for muscle spasms. 30 tablet 11    diltiazem ER (DILT-XR) 120 MG 24 hr capsule Take 1 capsule (120 mg) by mouth 2 times daily. 180 capsule 3    mometasone (ELOCON) 0.1 % external ointment APPLY TO ITCHY SPOT IN GROIN TWO TIMES A DAY UNTIL CLEAR. RESTART AS NEEDED.      Spacer/Aero-Holding Chambers (VORTEX VALVED HOLDING CHAMBER) CHICA Use with HFA 1 each 11    Vitamin D (Cholecalciferol) 25 MCG (1000 UT) TABS        No current facility-administered medications for this visit.      Allergies:   No Known Allergies    SocHx:   Social History     Tobacco Use    Smoking status: Former     Current packs/day: 0.00     Average packs/day: 1.5 packs/day for 30.0 years (45.0 ttl pk-yrs)     Types: Cigarettes     Start date: 1960     Quit date: 1990     Years since quittin.5     Passive exposure: Past    Smokeless tobacco: Never   Vaping Use    Vaping status: Never Used   Substance Use Topics    Alcohol use: Not Currently    Drug use: Never       Lives with her . Enjoys gardening, being outside, fishing, four wheeling.    FamHx:   Family History   Problem Relation Age of Onset    Ulcers Mother     Other - See Comments Mother          of old age    Heart Disease Father          at 74, probably  of massive MI    Breast Cancer Sister 42         at 48 of her lung cancer    Family History Negative Daughter     Family History Negative Daughter     Brain Cancer Son 45        glioblastoma IV    Colon Cancer No family hx of       Physical Exam  BP (!) 140/78 (BP Location: Right arm, Patient Position: Sitting, Cuff Size: Adult Regular)   Pulse 62   Wt 83.5 kg (184 lb)   LMP  (LMP Unknown)   BMI 30.62 kg/m    Gen: Well-appearing, NAD  Resp: nonlabored  Psych:  appropriate mood and affect      Assessment/Plan  Mackenzie Dos Santos is a 80 year old female here for left ovarian mass. Reviewed images with patient- appears to be a solid tumor without internal vascularity, well circumscribed. Explained this isn't how ovarian cancer typically presents but that it cannot be entirely excluded without excision. Discussed obtaining CA-125 today- if elevated, recommend immediate referral to gyn oncology. If the CA-125 is normal, offered repeat imaging in 1-2 months with ultrasound vs surgical removal. Explained that if she has surgery at [facility] and the mass is ultimately found to be malignant, she may need additional surgery for staging vs referral to gyn oncology for removal, understanding it may be benign. After thorough discussion, she prefers referral to gyn oncology now as she does not want to stress about it all summer and spends winter in [place].       Nora Fowler MD  OB/GYN  7/1/2025 3:04 PM

## 2025-07-02 LAB — CANCER AG125 SERPL-ACNC: 20 U/ML

## 2025-07-03 ENCOUNTER — RESULTS FOLLOW-UP (OUTPATIENT)
Dept: OBGYN | Facility: CLINIC | Age: 80
End: 2025-07-03

## 2025-07-11 ENCOUNTER — TRANSFERRED RECORDS (OUTPATIENT)
Dept: HEALTH INFORMATION MANAGEMENT | Facility: OTHER | Age: 80
End: 2025-07-11
Payer: COMMERCIAL

## 2025-07-14 ENCOUNTER — MYC MEDICAL ADVICE (OUTPATIENT)
Dept: CARDIOLOGY | Facility: OTHER | Age: 80
End: 2025-07-14
Payer: COMMERCIAL

## 2025-07-14 ENCOUNTER — MYC MEDICAL ADVICE (OUTPATIENT)
Dept: FAMILY MEDICINE | Facility: OTHER | Age: 80
End: 2025-07-14
Payer: COMMERCIAL

## 2025-07-14 NOTE — TELEPHONE ENCOUNTER
Dr. Wright,    DARLENE- sedation for eye procedure made her HR drop to 29.     They are requiring her to see Cardiology before next eye can be done.      7/11 Telephone encounter has been routed to Sonia Beyer about fitting her in.        Bess Hassan RN on 7/14/2025 at 12:44 PM

## 2025-07-14 NOTE — TELEPHONE ENCOUNTER
Agree with following up with cardiology as scheduled.  However, if she has any symptoms in the meantime of significant shortness of breath, lightheadedness, or passing out or near passing out, then she would need to be seen sooner than that in an urgent manner.

## 2025-07-19 ENCOUNTER — HEALTH MAINTENANCE LETTER (OUTPATIENT)
Age: 80
End: 2025-07-19

## 2025-07-21 DIAGNOSIS — R00.1 BRADYCARDIA: Primary | ICD-10-CM

## 2025-07-21 DIAGNOSIS — Z01.818 PREOP EXAMINATION: ICD-10-CM

## 2025-07-25 ENCOUNTER — TRANSFERRED RECORDS (OUTPATIENT)
Dept: HEALTH INFORMATION MANAGEMENT | Facility: OTHER | Age: 80
End: 2025-07-25
Payer: COMMERCIAL

## 2025-07-29 ENCOUNTER — MYC MEDICAL ADVICE (OUTPATIENT)
Dept: CARDIOLOGY | Facility: OTHER | Age: 80
End: 2025-07-29
Payer: COMMERCIAL

## 2025-08-04 ENCOUNTER — OFFICE VISIT (OUTPATIENT)
Dept: CARDIOLOGY | Facility: OTHER | Age: 80
End: 2025-08-04
Attending: NURSE PRACTITIONER
Payer: COMMERCIAL

## 2025-08-04 VITALS
RESPIRATION RATE: 16 BRPM | HEART RATE: 56 BPM | HEIGHT: 65 IN | DIASTOLIC BLOOD PRESSURE: 100 MMHG | TEMPERATURE: 97.8 F | SYSTOLIC BLOOD PRESSURE: 164 MMHG | OXYGEN SATURATION: 97 % | WEIGHT: 182.8 LBS | BODY MASS INDEX: 30.46 KG/M2

## 2025-08-04 DIAGNOSIS — I25.10 CORONARY ARTERY DISEASE INVOLVING NATIVE CORONARY ARTERY OF NATIVE HEART WITHOUT ANGINA PECTORIS: ICD-10-CM

## 2025-08-04 DIAGNOSIS — I47.29 NSVT (NONSUSTAINED VENTRICULAR TACHYCARDIA) (H): ICD-10-CM

## 2025-08-04 DIAGNOSIS — I47.10 SVT (SUPRAVENTRICULAR TACHYCARDIA): Primary | ICD-10-CM

## 2025-08-04 DIAGNOSIS — R00.1 BRADYCARDIA: ICD-10-CM

## 2025-08-04 DIAGNOSIS — Z01.810 PRE-OPERATIVE CARDIOVASCULAR EXAMINATION: ICD-10-CM

## 2025-08-04 LAB
ATRIAL RATE - MUSE: 56 BPM
DIASTOLIC BLOOD PRESSURE - MUSE: NORMAL MMHG
INTERPRETATION ECG - MUSE: NORMAL
P AXIS - MUSE: 73 DEGREES
PR INTERVAL - MUSE: 92 MS
QRS DURATION - MUSE: 94 MS
QT - MUSE: 456 MS
QTC - MUSE: 440 MS
R AXIS - MUSE: -57 DEGREES
SYSTOLIC BLOOD PRESSURE - MUSE: NORMAL MMHG
T AXIS - MUSE: -7 DEGREES
VENTRICULAR RATE- MUSE: 56 BPM

## 2025-08-04 PROCEDURE — 93005 ELECTROCARDIOGRAM TRACING: CPT | Performed by: NURSE PRACTITIONER

## 2025-08-04 PROCEDURE — 93010 ELECTROCARDIOGRAM REPORT: CPT | Performed by: INTERNAL MEDICINE

## 2025-08-04 PROCEDURE — 3080F DIAST BP >= 90 MM HG: CPT | Performed by: NURSE PRACTITIONER

## 2025-08-04 PROCEDURE — 99417 PROLNG OP E/M EACH 15 MIN: CPT | Performed by: NURSE PRACTITIONER

## 2025-08-04 PROCEDURE — G0463 HOSPITAL OUTPT CLINIC VISIT: HCPCS

## 2025-08-04 PROCEDURE — 1126F AMNT PAIN NOTED NONE PRSNT: CPT | Performed by: NURSE PRACTITIONER

## 2025-08-04 PROCEDURE — 3077F SYST BP >= 140 MM HG: CPT | Performed by: NURSE PRACTITIONER

## 2025-08-04 PROCEDURE — 99215 OFFICE O/P EST HI 40 MIN: CPT | Performed by: NURSE PRACTITIONER

## 2025-08-04 ASSESSMENT — PAIN SCALES - GENERAL: PAINLEVEL_OUTOF10: NO PAIN (0)

## 2025-08-05 ENCOUNTER — TELEPHONE (OUTPATIENT)
Dept: FAMILY MEDICINE | Facility: OTHER | Age: 80
End: 2025-08-05
Payer: COMMERCIAL

## 2025-08-05 ENCOUNTER — TELEPHONE (OUTPATIENT)
Dept: CARDIOLOGY | Facility: OTHER | Age: 80
End: 2025-08-05
Payer: COMMERCIAL

## 2025-08-05 ENCOUNTER — MEDICAL CORRESPONDENCE (OUTPATIENT)
Dept: ULTRASOUND IMAGING | Facility: HOSPITAL | Age: 80
End: 2025-08-05

## 2025-08-06 ENCOUNTER — MYC MEDICAL ADVICE (OUTPATIENT)
Dept: FAMILY MEDICINE | Facility: OTHER | Age: 80
End: 2025-08-06
Payer: COMMERCIAL

## 2025-08-06 ENCOUNTER — MYC MEDICAL ADVICE (OUTPATIENT)
Dept: CARDIOLOGY | Facility: OTHER | Age: 80
End: 2025-08-06
Payer: COMMERCIAL

## 2025-08-06 DIAGNOSIS — R00.2 HEART PALPITATIONS: ICD-10-CM

## 2025-08-06 DIAGNOSIS — I47.10 SVT (SUPRAVENTRICULAR TACHYCARDIA): ICD-10-CM

## 2025-08-06 DIAGNOSIS — I49.1 PAC (PREMATURE ATRIAL CONTRACTION): ICD-10-CM

## 2025-08-06 DIAGNOSIS — I49.3 PVC'S (PREMATURE VENTRICULAR CONTRACTIONS): Primary | ICD-10-CM

## 2025-08-06 DIAGNOSIS — R42 DIZZINESS: ICD-10-CM

## 2025-08-22 ENCOUNTER — TELEPHONE (OUTPATIENT)
Dept: FAMILY MEDICINE | Facility: OTHER | Age: 80
End: 2025-08-22
Payer: COMMERCIAL

## 2025-08-22 DIAGNOSIS — M54.16 RADICULOPATHY, LUMBAR REGION: ICD-10-CM

## 2025-08-22 DIAGNOSIS — M48.061 LUMBAR FORAMINAL STENOSIS: Primary | ICD-10-CM

## 2025-08-22 DIAGNOSIS — M54.41 ACUTE BILATERAL LOW BACK PAIN WITH RIGHT-SIDED SCIATICA: ICD-10-CM

## 2025-08-26 ENCOUNTER — OFFICE VISIT (OUTPATIENT)
Dept: CARDIOLOGY | Facility: OTHER | Age: 80
End: 2025-08-26
Attending: INTERNAL MEDICINE
Payer: MEDICARE

## 2025-08-26 ENCOUNTER — TELEPHONE (OUTPATIENT)
Dept: SURGERY | Facility: OTHER | Age: 80
End: 2025-08-26

## 2025-08-26 VITALS
WEIGHT: 184.4 LBS | RESPIRATION RATE: 16 BRPM | TEMPERATURE: 97.1 F | HEIGHT: 65 IN | HEART RATE: 48 BPM | SYSTOLIC BLOOD PRESSURE: 144 MMHG | BODY MASS INDEX: 30.72 KG/M2 | OXYGEN SATURATION: 97 % | DIASTOLIC BLOOD PRESSURE: 78 MMHG

## 2025-08-26 DIAGNOSIS — I10 BENIGN ESSENTIAL HYPERTENSION: ICD-10-CM

## 2025-08-26 DIAGNOSIS — I49.3 FREQUENT PVCS: Primary | ICD-10-CM

## 2025-08-26 DIAGNOSIS — I49.3 PVC'S (PREMATURE VENTRICULAR CONTRACTIONS): ICD-10-CM

## 2025-08-26 DIAGNOSIS — R00.1 BRADYCARDIA: ICD-10-CM

## 2025-08-26 DIAGNOSIS — R06.09 DOE (DYSPNEA ON EXERTION): ICD-10-CM

## 2025-08-26 DIAGNOSIS — R42 DIZZINESS: ICD-10-CM

## 2025-08-26 DIAGNOSIS — R00.2 HEART PALPITATIONS: ICD-10-CM

## 2025-08-26 DIAGNOSIS — I47.10 SVT (SUPRAVENTRICULAR TACHYCARDIA): ICD-10-CM

## 2025-08-26 DIAGNOSIS — I49.1 PAC (PREMATURE ATRIAL CONTRACTION): ICD-10-CM

## 2025-08-26 DIAGNOSIS — I48.0 PAROXYSMAL ATRIAL FIBRILLATION (H): ICD-10-CM

## 2025-08-26 PROCEDURE — G0463 HOSPITAL OUTPT CLINIC VISIT: HCPCS

## 2025-08-26 PROCEDURE — 93291 INTERROG DEV EVAL SCRMS IP: CPT | Performed by: INTERNAL MEDICINE

## 2025-08-26 RX ORDER — HYDROCHLOROTHIAZIDE 25 MG/1
25 TABLET ORAL DAILY
Qty: 90 TABLET | Refills: 3 | Status: SHIPPED | OUTPATIENT
Start: 2025-08-26

## 2025-08-26 RX ORDER — DILTIAZEM HYDROCHLORIDE 120 MG/1
120 CAPSULE, EXTENDED RELEASE ORAL DAILY
Qty: 90 CAPSULE | Refills: 3 | Status: SHIPPED | OUTPATIENT
Start: 2025-08-26

## 2025-08-26 ASSESSMENT — PAIN SCALES - GENERAL: PAINLEVEL_OUTOF10: NO PAIN (0)

## 2025-09-04 ENCOUNTER — HOSPITAL ENCOUNTER (OUTPATIENT)
Dept: GENERAL RADIOLOGY | Facility: OTHER | Age: 80
End: 2025-09-04
Attending: FAMILY MEDICINE
Payer: MEDICARE

## 2025-09-04 DIAGNOSIS — M54.41 ACUTE BILATERAL LOW BACK PAIN WITH RIGHT-SIDED SCIATICA: ICD-10-CM

## 2025-09-04 DIAGNOSIS — M48.061 LUMBAR FORAMINAL STENOSIS: ICD-10-CM

## 2025-09-04 DIAGNOSIS — M54.16 RADICULOPATHY, LUMBAR REGION: ICD-10-CM

## 2025-09-04 PROCEDURE — 250N000009 HC RX 250: Performed by: RADIOLOGY

## 2025-09-04 PROCEDURE — 250N000011 HC RX IP 250 OP 636: Performed by: RADIOLOGY

## 2025-09-04 PROCEDURE — 255N000002 HC RX 255 OP 636: Performed by: RADIOLOGY

## 2025-09-04 PROCEDURE — 64483 NJX AA&/STRD TFRM EPI L/S 1: CPT | Mod: RT

## 2025-09-04 RX ORDER — DEXAMETHASONE SODIUM PHOSPHATE 10 MG/ML
10 INJECTION, SOLUTION INTRAMUSCULAR; INTRAVENOUS ONCE
Status: COMPLETED | OUTPATIENT
Start: 2025-09-04 | End: 2025-09-04

## 2025-09-04 RX ORDER — LIDOCAINE HYDROCHLORIDE 10 MG/ML
2 INJECTION, SOLUTION EPIDURAL; INFILTRATION; INTRACAUDAL; PERINEURAL ONCE
Status: COMPLETED | OUTPATIENT
Start: 2025-09-04 | End: 2025-09-04

## 2025-09-04 RX ADMIN — LIDOCAINE HYDROCHLORIDE 2 ML: 10 INJECTION, SOLUTION EPIDURAL; INFILTRATION; INTRACAUDAL; PERINEURAL at 09:16

## 2025-09-04 RX ADMIN — IOHEXOL 1 ML: 240 INJECTION, SOLUTION INTRATHECAL; INTRAVASCULAR; INTRAVENOUS; ORAL at 09:15

## 2025-09-04 RX ADMIN — LIDOCAINE HYDROCHLORIDE 1 ML: 10 INJECTION, SOLUTION INFILTRATION; PERINEURAL at 09:15

## 2025-09-04 RX ADMIN — DEXAMETHASONE SODIUM PHOSPHATE 10 MG: 10 INJECTION, SOLUTION INTRAMUSCULAR; INTRAVENOUS at 09:16

## (undated) RX ORDER — LIDOCAINE HYDROCHLORIDE 10 MG/ML
INJECTION, SOLUTION EPIDURAL; INFILTRATION; INTRACAUDAL; PERINEURAL
Status: DISPENSED
Start: 2025-09-04

## (undated) RX ORDER — LIDOCAINE HYDROCHLORIDE 10 MG/ML
INJECTION, SOLUTION INFILTRATION; PERINEURAL
Status: DISPENSED
Start: 2025-09-04

## (undated) RX ORDER — LIDOCAINE HYDROCHLORIDE 10 MG/ML
INJECTION, SOLUTION INFILTRATION; PERINEURAL
Status: DISPENSED
Start: 2025-06-03

## (undated) RX ORDER — DEXAMETHASONE SODIUM PHOSPHATE 10 MG/ML
INJECTION, SOLUTION INTRAMUSCULAR; INTRAVENOUS
Status: DISPENSED
Start: 2025-09-04

## (undated) RX ORDER — DEXAMETHASONE SODIUM PHOSPHATE 10 MG/ML
INJECTION, SOLUTION INTRAMUSCULAR; INTRAVENOUS
Status: DISPENSED
Start: 2025-06-03

## (undated) RX ORDER — LIDOCAINE HYDROCHLORIDE 10 MG/ML
INJECTION, SOLUTION EPIDURAL; INFILTRATION; INTRACAUDAL; PERINEURAL
Status: DISPENSED
Start: 2025-06-03